# Patient Record
Sex: MALE | Race: BLACK OR AFRICAN AMERICAN | Employment: OTHER | URBAN - METROPOLITAN AREA
[De-identification: names, ages, dates, MRNs, and addresses within clinical notes are randomized per-mention and may not be internally consistent; named-entity substitution may affect disease eponyms.]

---

## 2017-10-18 ENCOUNTER — OFFICE VISIT (OUTPATIENT)
Dept: ORTHOPEDIC SURGERY | Age: 42
End: 2017-10-18

## 2017-10-18 VITALS
BODY MASS INDEX: 34.96 KG/M2 | WEIGHT: 190 LBS | HEART RATE: 77 BPM | SYSTOLIC BLOOD PRESSURE: 142 MMHG | RESPIRATION RATE: 16 BRPM | DIASTOLIC BLOOD PRESSURE: 92 MMHG | HEIGHT: 62 IN

## 2017-10-18 DIAGNOSIS — S82.62XA CLOSED AVULSION FRACTURE OF LATERAL MALLEOLUS OF LEFT FIBULA, INITIAL ENCOUNTER: Primary | ICD-10-CM

## 2017-10-18 PROCEDURE — 1036F TOBACCO NON-USER: CPT | Performed by: ORTHOPAEDIC SURGERY

## 2017-10-18 PROCEDURE — L4360 PNEUMAT WALKING BOOT PRE CST: HCPCS | Performed by: ORTHOPAEDIC SURGERY

## 2017-10-18 PROCEDURE — G8427 DOCREV CUR MEDS BY ELIG CLIN: HCPCS | Performed by: ORTHOPAEDIC SURGERY

## 2017-10-18 PROCEDURE — 99203 OFFICE O/P NEW LOW 30 MIN: CPT | Performed by: ORTHOPAEDIC SURGERY

## 2017-10-18 PROCEDURE — 27786 TREATMENT OF ANKLE FRACTURE: CPT | Performed by: ORTHOPAEDIC SURGERY

## 2017-10-18 PROCEDURE — G8484 FLU IMMUNIZE NO ADMIN: HCPCS | Performed by: ORTHOPAEDIC SURGERY

## 2017-10-18 PROCEDURE — G8417 CALC BMI ABV UP PARAM F/U: HCPCS | Performed by: ORTHOPAEDIC SURGERY

## 2017-10-18 NOTE — LETTER
[facility]  [address] [facility],[address] [facility] [address]  Phone: [phone]  Fax: [phone]    No ref. provider found        October 23, 2017       Patient: Yessica Henderson   MR Number: [id number]   YOB: 1975   Date of Visit: 10/18/2017       Dear Dr. Malcolm Felty ref. provider found: Thank you for the request for consultation for Yessica Henderson to me for  evaluation. Below are the relevant portions of my assessment and plan of care. CHIEF COMPLAINT: Left ankle pain / lateral malleolus avulsion fracture. DATE OF INJURY: 10/15/2017 DOT 10/18/2017    HISTORY:  Mr. Elliot Bonilla 39 y.o.  male presents today for the first visit for evaluation of a left ankle injury which occurred when he was walking down a step and rolled his left ankle. He was first seen and evaluated in [facility] ER, where he was x-rayed, splinted and asked to f/u with Orthopedics. He is complaining of lateral ankle pain and swelling. Rates pain a 4/10 VAS and constant sharp mild achy. This is better with elevation and worse with bearing any wt. The pain is sharp and not radiating. Alleviating factors: elevation and rest. No other complaint. Past Medical History:   Diagnosis Date    Sleep apnea        History reviewed. No pertinent surgical history. Social History     Social History    Marital status:      Spouse name: N/A    Number of children: N/A    Years of education: N/A     Occupational History    Not on file. Social History Main Topics    Smoking status: Never Smoker    Smokeless tobacco: Never Used    Alcohol use No    Drug use: No    Sexual activity: Not on file     Other Topics Concern    Not on file     Social History Narrative    No narrative on file       History reviewed. No pertinent family history.     Current Outpatient Prescriptions on File Prior to Visit   Medication Sig Dispense Refill office and instructed him  in care. We will see him  back in 6 weeks at which time we will get a new xray of the left ankle. Procedures    VT CLOSED [address] FIBULA FX    Breg Tall Genisus Walking Boot     Patient was prescribed a Breg Tall Genisus Walking Boot. The left ankle will require stabilization / immobilization from this semi-rigid / rigid orthosis to improve their function. The orthosis will assist in protecting the affected area, provide functional support and facilitate healing. The patient was educated and fit by a healthcare professional with expert knowledge and specialization in brace application while under the direct supervision of the physician. Verbal and written instructions for the use of and application of this item were provided. They were instructed to contact the office immediately should the brace result in increased pain, decreased sensation, increased swelling or worsening of the condition. Sameer Burdick MD   If you have questions, please do not hesitate to call me. I look forward to following  along with you.     Sincerely,        Sameer Burdick MD    CC providers:  Catrina Chiu MD  [address]  [address] [facility] [address]: [phone]

## 2017-10-19 NOTE — PROGRESS NOTES
CHIEF COMPLAINT: Left ankle pain / lateral malleolus avulsion fracture. DATE OF INJURY: 10/15/2017 DOT 10/18/2017    HISTORY:  Mr. Jayce Steel 39 y.o.  male presents today for the first visit for evaluation of a left ankle injury which occurred when he was walking down a step and rolled his left ankle. He was first seen and evaluated in [facility] ER, where he was x-rayed, splinted and asked to f/u with Orthopedics. He is complaining of lateral ankle pain and swelling. Rates pain a 4/10 VAS and constant sharp mild achy. This is better with elevation and worse with bearing any wt. The pain is sharp and not radiating. Alleviating factors: elevation and rest. No other complaint. Past Medical History:   Diagnosis Date    Sleep apnea        History reviewed. No pertinent surgical history. Social History     Social History    Marital status:      Spouse name: N/A    Number of children: N/A    Years of education: N/A     Occupational History    Not on file. Social History Main Topics    Smoking status: Never Smoker    Smokeless tobacco: Never Used    Alcohol use No    Drug use: No    Sexual activity: Not on file     Other Topics Concern    Not on file     Social History Narrative    No narrative on file       History reviewed. No pertinent family history. Current Outpatient Prescriptions on File Prior to Visit   Medication Sig Dispense Refill    ibuprofen (ADVIL;MOTRIN) 800 MG tablet Take 1 tablet by mouth every 8 hours as needed for Pain 20 tablet 0    HYDROcodone-acetaminophen (NORCO) 5-325 MG per tablet Take 1 tablet by mouth every 6 hours as needed for Pain . 10 tablet 0    ondansetron (ZOFRAN) 4 MG tablet Take 1 tablet by mouth every 8 hours as needed for Nausea. 20 tablet 0    omeprazole (PRILOSEC) 20 MG capsule Take 1 capsule by mouth daily. 30 capsule 0     No current facility-administered medications on file prior to visit.         Pertinent items are noted in expert knowledge and specialization in brace application while under the direct supervision of the physician. Verbal and written instructions for the use of and application of this item were provided. They were instructed to contact the office immediately should the brace result in increased pain, decreased sensation, increased swelling or worsening of the condition.          Rodney Bernard MD

## 2017-10-22 PROBLEM — S82.62XA CLOSED AVULSION FRACTURE OF LATERAL MALLEOLUS OF LEFT FIBULA: Status: ACTIVE | Noted: 2017-10-22

## 2017-10-23 NOTE — COMMUNICATION BODY
CHIEF COMPLAINT: Left ankle pain / lateral malleolus avulsion fracture. DATE OF INJURY: 10/15/2017 DOT 10/18/2017    HISTORY:  Mr. Emili Verde 39 y.o.  male presents today for the first visit for evaluation of a left ankle injury which occurred when he was walking down a step and rolled his left ankle. He was first seen and evaluated in [facility] ER, where he was x-rayed, splinted and asked to f/u with Orthopedics. He is complaining of lateral ankle pain and swelling. Rates pain a 4/10 VAS and constant sharp mild achy. This is better with elevation and worse with bearing any wt. The pain is sharp and not radiating. Alleviating factors: elevation and rest. No other complaint. Past Medical History:   Diagnosis Date    Sleep apnea        History reviewed. No pertinent surgical history. Social History     Social History    Marital status:      Spouse name: N/A    Number of children: N/A    Years of education: N/A     Occupational History    Not on file. Social History Main Topics    Smoking status: Never Smoker    Smokeless tobacco: Never Used    Alcohol use No    Drug use: No    Sexual activity: Not on file     Other Topics Concern    Not on file     Social History Narrative    No narrative on file       History reviewed. No pertinent family history. Current Outpatient Prescriptions on File Prior to Visit   Medication Sig Dispense Refill    ibuprofen (ADVIL;MOTRIN) 800 MG tablet Take 1 tablet by mouth every 8 hours as needed for Pain 20 tablet 0    HYDROcodone-acetaminophen (NORCO) 5-325 MG per tablet Take 1 tablet by mouth every 6 hours as needed for Pain . 10 tablet 0    ondansetron (ZOFRAN) 4 MG tablet Take 1 tablet by mouth every 8 hours as needed for Nausea. 20 tablet 0    omeprazole (PRILOSEC) 20 MG capsule Take 1 capsule by mouth daily. 30 capsule 0     No current facility-administered medications on file prior to visit.         Pertinent items are noted in expert knowledge and specialization in brace application while under the direct supervision of the physician. Verbal and written instructions for the use of and application of this item were provided. They were instructed to contact the office immediately should the brace result in increased pain, decreased sensation, increased swelling or worsening of the condition.          Danielle Whitaker MD

## 2017-12-01 ENCOUNTER — OFFICE VISIT (OUTPATIENT)
Dept: ORTHOPEDIC SURGERY | Age: 42
End: 2017-12-01

## 2017-12-01 VITALS — RESPIRATION RATE: 17 BRPM | BODY MASS INDEX: 37.31 KG/M2 | HEIGHT: 60 IN | WEIGHT: 190.04 LBS | HEART RATE: 70 BPM

## 2017-12-01 DIAGNOSIS — S82.62XD CLOSED AVULSION FRACTURE OF LATERAL MALLEOLUS OF LEFT FIBULA WITH ROUTINE HEALING, SUBSEQUENT ENCOUNTER: Primary | ICD-10-CM

## 2017-12-01 PROCEDURE — 99024 POSTOP FOLLOW-UP VISIT: CPT | Performed by: NURSE PRACTITIONER

## 2017-12-01 NOTE — LETTER
hours as needed for Pain 20 tablet 0    HYDROcodone-acetaminophen (NORCO) 5-325 MG per tablet Take 1 tablet by mouth every 6 hours as needed for Pain . 10 tablet 0    ondansetron (ZOFRAN) 4 MG tablet Take 1 tablet by mouth every 8 hours as needed for Nausea. 20 tablet 0    omeprazole (PRILOSEC) 20 MG capsule Take 1 capsule by mouth daily. 30 capsule 0     No current facility-administered medications on file prior to visit. Pertinent items are noted in HPI  Review of systems reviewed from Patient History Form dated on 10/18/2017 and available in the patient's chart under the Media tab. PHYSICAL EXAMINATION:  Mr. Martell Joyner is a very pleasant 43 y.o.  male who presents today in no acute distress, awake, alert, and oriented. He is well dressed, nourished and  groomed. Patient with normal affect. Height is  4' 11.06\" (1.5 m), weight is 190 lb 0.6 oz (86.2 kg), Body mass index is 38.31 kg/m². Resting respiratory rate is 16. Examination of the gait, showed that the patient walks WB left leg and in a boot . Examination of both ankles showing a full range of motion of the left ankle compare to the other side. There is mild to no swelling that can be seen, no ecchymosis. He has intact sensation and good pedal pulses. He has no tenderness on deep palpation over the lateral malleolus of the left ankle. IMAGING: Xrays, 3 views of the left ankle taken today in the office,  were reviewed, and showed a lateral malleolus avulsion fracture. IMPRESSION: Left ankle lateral malleolus ankle avulsion fracture. PLAN:  He will be WBAT and can discontinue the boot, and start aggressive ROM and peroneal strengthening exercise. No heavy impact activities. The patient will come back for a follow up in 2 months. At that time, we will take 3 views of the left ankle standing. Libby Lozano CNP   If you have questions, please do not hesitate to call me.  I look forward

## 2017-12-04 NOTE — PROGRESS NOTES
patient's chart under the Media tab. PHYSICAL EXAMINATION:  Mr. Parul Bourgeois is a very pleasant 43 y.o.  male who presents today in no acute distress, awake, alert, and oriented. He is well dressed, nourished and  groomed. Patient with normal affect. Height is  4' 11.06\" (1.5 m), weight is 190 lb 0.6 oz (86.2 kg), Body mass index is 38.31 kg/m². Resting respiratory rate is 16. Examination of the gait, showed that the patient walks WB left leg and in a boot . Examination of both ankles showing a full range of motion of the left ankle compare to the other side. There is mild to no swelling that can be seen, no ecchymosis. He has intact sensation and good pedal pulses. He has no tenderness on deep palpation over the lateral malleolus of the left ankle. IMAGING: Xrays, 3 views of the left ankle taken today in the office,  were reviewed, and showed a lateral malleolus avulsion fracture. IMPRESSION: Left ankle lateral malleolus ankle avulsion fracture. PLAN:  He will be WBAT and can discontinue the boot, and start aggressive ROM and peroneal strengthening exercise. No heavy impact activities. The patient will come back for a follow up in 2 months. At that time, we will take 3 views of the left ankle standing.   Surinder Bullard, CNP

## 2017-12-15 NOTE — COMMUNICATION BODY
patient's chart under the Media tab. PHYSICAL EXAMINATION:  Mr. Natalia Linder is a very pleasant 43 y.o.  male who presents today in no acute distress, awake, alert, and oriented. He is well dressed, nourished and  groomed. Patient with normal affect. Height is  4' 11.06\" (1.5 m), weight is 190 lb 0.6 oz (86.2 kg), Body mass index is 38.31 kg/m². Resting respiratory rate is 16. Examination of the gait, showed that the patient walks WB left leg and in a boot . Examination of both ankles showing a full range of motion of the left ankle compare to the other side. There is mild to no swelling that can be seen, no ecchymosis. He has intact sensation and good pedal pulses. He has no tenderness on deep palpation over the lateral malleolus of the left ankle. IMAGING: Xrays, 3 views of the left ankle taken today in the office,  were reviewed, and showed a lateral malleolus avulsion fracture. IMPRESSION: Left ankle lateral malleolus ankle avulsion fracture. PLAN:  He will be WBAT and can discontinue the boot, and start aggressive ROM and peroneal strengthening exercise. No heavy impact activities. The patient will come back for a follow up in 2 months. At that time, we will take 3 views of the left ankle standing.   Fredy Castro, CNP

## 2019-05-15 ENCOUNTER — HOSPITAL ENCOUNTER (INPATIENT)
Age: 44
LOS: 3 days | Discharge: HOME | DRG: 263 | End: 2019-05-18
Attending: INTERNAL MEDICINE | Admitting: INTERNAL MEDICINE
Payer: COMMERCIAL

## 2019-05-15 ENCOUNTER — HOSPITAL ENCOUNTER (EMERGENCY)
Age: 44
Discharge: HOME | DRG: 263 | End: 2019-05-15
Attending: EMERGENCY MEDICINE
Payer: COMMERCIAL

## 2019-05-15 ENCOUNTER — APPOINTMENT (OUTPATIENT)
Dept: GENERAL RADIOLOGY | Age: 44
DRG: 263 | End: 2019-05-15
Payer: COMMERCIAL

## 2019-05-15 ENCOUNTER — APPOINTMENT (OUTPATIENT)
Dept: CT IMAGING | Age: 44
DRG: 263 | End: 2019-05-15
Payer: COMMERCIAL

## 2019-05-15 ENCOUNTER — APPOINTMENT (OUTPATIENT)
Dept: ULTRASOUND IMAGING | Age: 44
DRG: 263 | End: 2019-05-15
Payer: COMMERCIAL

## 2019-05-15 VITALS
OXYGEN SATURATION: 99 % | DIASTOLIC BLOOD PRESSURE: 91 MMHG | BODY MASS INDEX: 39.47 KG/M2 | RESPIRATION RATE: 13 BRPM | TEMPERATURE: 98.6 F | HEART RATE: 90 BPM | SYSTOLIC BLOOD PRESSURE: 156 MMHG | WEIGHT: 195.77 LBS

## 2019-05-15 DIAGNOSIS — R03.0 ELEVATED BLOOD PRESSURE READING: ICD-10-CM

## 2019-05-15 DIAGNOSIS — K80.00 GALLSTONES AND INFLAMMATION OF GALLBLADDER WITHOUT OBSTRUCTION: ICD-10-CM

## 2019-05-15 DIAGNOSIS — R11.2 NAUSEA AND VOMITING, INTRACTABILITY OF VOMITING NOT SPECIFIED, UNSPECIFIED VOMITING TYPE: ICD-10-CM

## 2019-05-15 DIAGNOSIS — R94.31 T WAVE INVERSION IN ELECTROCARDIOGRAM: ICD-10-CM

## 2019-05-15 DIAGNOSIS — R10.9 ABDOMINAL PAIN, UNSPECIFIED ABDOMINAL LOCATION: Primary | ICD-10-CM

## 2019-05-15 DIAGNOSIS — K81.0 ACUTE CHOLECYSTITIS: Primary | ICD-10-CM

## 2019-05-15 DIAGNOSIS — R94.31 ABNORMAL EKG: ICD-10-CM

## 2019-05-15 DIAGNOSIS — K80.50 BILIARY COLIC: ICD-10-CM

## 2019-05-15 LAB
A/G RATIO: 0.9 (ref 1.1–2.2)
A/G RATIO: 1 (ref 1.1–2.2)
ALBUMIN SERPL-MCNC: 3.9 G/DL (ref 3.4–5)
ALBUMIN SERPL-MCNC: 4.1 G/DL (ref 3.4–5)
ALP BLD-CCNC: 69 U/L (ref 40–129)
ALP BLD-CCNC: 75 U/L (ref 40–129)
ALT SERPL-CCNC: 25 U/L (ref 10–40)
ALT SERPL-CCNC: 92 U/L (ref 10–40)
ANION GAP SERPL CALCULATED.3IONS-SCNC: 15 MMOL/L (ref 3–16)
ANION GAP SERPL CALCULATED.3IONS-SCNC: 18 MMOL/L (ref 3–16)
AST SERPL-CCNC: 111 U/L (ref 15–37)
AST SERPL-CCNC: 30 U/L (ref 15–37)
BASOPHILS ABSOLUTE: 0.1 K/UL (ref 0–0.2)
BASOPHILS ABSOLUTE: 0.2 K/UL (ref 0–0.2)
BASOPHILS RELATIVE PERCENT: 0.7 %
BASOPHILS RELATIVE PERCENT: 1 %
BILIRUB SERPL-MCNC: 0.7 MG/DL (ref 0–1)
BILIRUB SERPL-MCNC: 1.3 MG/DL (ref 0–1)
BUN BLDV-MCNC: 10 MG/DL (ref 7–20)
BUN BLDV-MCNC: 12 MG/DL (ref 7–20)
CALCIUM SERPL-MCNC: 10 MG/DL (ref 8.3–10.6)
CALCIUM SERPL-MCNC: 9.5 MG/DL (ref 8.3–10.6)
CHLORIDE BLD-SCNC: 100 MMOL/L (ref 99–110)
CHLORIDE BLD-SCNC: 99 MMOL/L (ref 99–110)
CO2: 22 MMOL/L (ref 21–32)
CO2: 23 MMOL/L (ref 21–32)
CREAT SERPL-MCNC: 1.2 MG/DL (ref 0.9–1.3)
CREAT SERPL-MCNC: 1.3 MG/DL (ref 0.9–1.3)
EKG ATRIAL RATE: 67 BPM
EKG ATRIAL RATE: 85 BPM
EKG DIAGNOSIS: NORMAL
EKG DIAGNOSIS: NORMAL
EKG P AXIS: 59 DEGREES
EKG P AXIS: 78 DEGREES
EKG P-R INTERVAL: 142 MS
EKG P-R INTERVAL: 144 MS
EKG Q-T INTERVAL: 392 MS
EKG Q-T INTERVAL: 432 MS
EKG QRS DURATION: 84 MS
EKG QRS DURATION: 86 MS
EKG QTC CALCULATION (BAZETT): 456 MS
EKG QTC CALCULATION (BAZETT): 466 MS
EKG R AXIS: 15 DEGREES
EKG R AXIS: 56 DEGREES
EKG T AXIS: 178 DEGREES
EKG T AXIS: 225 DEGREES
EKG VENTRICULAR RATE: 67 BPM
EKG VENTRICULAR RATE: 85 BPM
EOSINOPHILS ABSOLUTE: 0 K/UL (ref 0–0.6)
EOSINOPHILS ABSOLUTE: 0 K/UL (ref 0–0.6)
EOSINOPHILS RELATIVE PERCENT: 0.1 %
EOSINOPHILS RELATIVE PERCENT: 0.2 %
GFR AFRICAN AMERICAN: >60
GFR AFRICAN AMERICAN: >60
GFR NON-AFRICAN AMERICAN: >60
GFR NON-AFRICAN AMERICAN: >60
GLOBULIN: 4.1 G/DL
GLOBULIN: 4.5 G/DL
GLUCOSE BLD-MCNC: 120 MG/DL (ref 70–99)
GLUCOSE BLD-MCNC: 129 MG/DL (ref 70–99)
HCT VFR BLD CALC: 45.1 % (ref 40.5–52.5)
HCT VFR BLD CALC: 45.5 % (ref 40.5–52.5)
HEMOGLOBIN: 15.2 G/DL (ref 13.5–17.5)
HEMOGLOBIN: 15.3 G/DL (ref 13.5–17.5)
LIPASE: 168 U/L (ref 13–60)
LIPASE: 35 U/L (ref 13–60)
LYMPHOCYTES ABSOLUTE: 1.9 K/UL (ref 1–5.1)
LYMPHOCYTES ABSOLUTE: 2.3 K/UL (ref 1–5.1)
LYMPHOCYTES RELATIVE PERCENT: 12.9 %
LYMPHOCYTES RELATIVE PERCENT: 14.4 %
MCH RBC QN AUTO: 29.5 PG (ref 26–34)
MCH RBC QN AUTO: 30 PG (ref 26–34)
MCHC RBC AUTO-ENTMCNC: 33.3 G/DL (ref 31–36)
MCHC RBC AUTO-ENTMCNC: 34 G/DL (ref 31–36)
MCV RBC AUTO: 88.2 FL (ref 80–100)
MCV RBC AUTO: 88.8 FL (ref 80–100)
MONOCYTES ABSOLUTE: 1.1 K/UL (ref 0–1.3)
MONOCYTES ABSOLUTE: 1.5 K/UL (ref 0–1.3)
MONOCYTES RELATIVE PERCENT: 10.6 %
MONOCYTES RELATIVE PERCENT: 6.9 %
NEUTROPHILS ABSOLUTE: 10.9 K/UL (ref 1.7–7.7)
NEUTROPHILS ABSOLUTE: 12.6 K/UL (ref 1.7–7.7)
NEUTROPHILS RELATIVE PERCENT: 75.6 %
NEUTROPHILS RELATIVE PERCENT: 77.6 %
PDW BLD-RTO: 13.5 % (ref 12.4–15.4)
PDW BLD-RTO: 13.7 % (ref 12.4–15.4)
PLATELET # BLD: 250 K/UL (ref 135–450)
PLATELET # BLD: 262 K/UL (ref 135–450)
PMV BLD AUTO: 8.5 FL (ref 5–10.5)
PMV BLD AUTO: 8.6 FL (ref 5–10.5)
POTASSIUM REFLEX MAGNESIUM: 4.1 MMOL/L (ref 3.5–5.1)
POTASSIUM SERPL-SCNC: 3.9 MMOL/L (ref 3.5–5.1)
RBC # BLD: 5.11 M/UL (ref 4.2–5.9)
RBC # BLD: 5.13 M/UL (ref 4.2–5.9)
SODIUM BLD-SCNC: 137 MMOL/L (ref 136–145)
SODIUM BLD-SCNC: 140 MMOL/L (ref 136–145)
TOTAL PROTEIN: 8 G/DL (ref 6.4–8.2)
TOTAL PROTEIN: 8.6 G/DL (ref 6.4–8.2)
TROPONIN: <0.01 NG/ML
WBC # BLD: 14.4 K/UL (ref 4–11)
WBC # BLD: 16.2 K/UL (ref 4–11)

## 2019-05-15 PROCEDURE — 96361 HYDRATE IV INFUSION ADD-ON: CPT

## 2019-05-15 PROCEDURE — 87086 URINE CULTURE/COLONY COUNT: CPT

## 2019-05-15 PROCEDURE — 71046 X-RAY EXAM CHEST 2 VIEWS: CPT

## 2019-05-15 PROCEDURE — 93010 ELECTROCARDIOGRAM REPORT: CPT | Performed by: INTERNAL MEDICINE

## 2019-05-15 PROCEDURE — 2580000003 HC RX 258: Performed by: EMERGENCY MEDICINE

## 2019-05-15 PROCEDURE — 85025 COMPLETE CBC W/AUTO DIFF WBC: CPT

## 2019-05-15 PROCEDURE — 6370000000 HC RX 637 (ALT 250 FOR IP): Performed by: EMERGENCY MEDICINE

## 2019-05-15 PROCEDURE — 6360000002 HC RX W HCPCS: Performed by: EMERGENCY MEDICINE

## 2019-05-15 PROCEDURE — 74177 CT ABD & PELVIS W/CONTRAST: CPT

## 2019-05-15 PROCEDURE — 84484 ASSAY OF TROPONIN QUANT: CPT

## 2019-05-15 PROCEDURE — 93005 ELECTROCARDIOGRAM TRACING: CPT | Performed by: PHYSICIAN ASSISTANT

## 2019-05-15 PROCEDURE — 96374 THER/PROPH/DIAG INJ IV PUSH: CPT

## 2019-05-15 PROCEDURE — 85610 PROTHROMBIN TIME: CPT

## 2019-05-15 PROCEDURE — 6360000004 HC RX CONTRAST MEDICATION: Performed by: EMERGENCY MEDICINE

## 2019-05-15 PROCEDURE — 76705 ECHO EXAM OF ABDOMEN: CPT

## 2019-05-15 PROCEDURE — 6360000002 HC RX W HCPCS: Performed by: PHYSICIAN ASSISTANT

## 2019-05-15 PROCEDURE — 99285 EMERGENCY DEPT VISIT HI MDM: CPT

## 2019-05-15 PROCEDURE — 2500000003 HC RX 250 WO HCPCS: Performed by: NURSE PRACTITIONER

## 2019-05-15 PROCEDURE — 80053 COMPREHEN METABOLIC PANEL: CPT

## 2019-05-15 PROCEDURE — 6360000002 HC RX W HCPCS: Performed by: INTERNAL MEDICINE

## 2019-05-15 PROCEDURE — 93005 ELECTROCARDIOGRAM TRACING: CPT | Performed by: EMERGENCY MEDICINE

## 2019-05-15 PROCEDURE — 2580000003 HC RX 258: Performed by: INTERNAL MEDICINE

## 2019-05-15 PROCEDURE — 1200000000 HC SEMI PRIVATE

## 2019-05-15 PROCEDURE — 83690 ASSAY OF LIPASE: CPT

## 2019-05-15 RX ORDER — ONDANSETRON 2 MG/ML
4 INJECTION INTRAMUSCULAR; INTRAVENOUS EVERY 6 HOURS PRN
Status: DISCONTINUED | OUTPATIENT
Start: 2019-05-15 | End: 2019-05-18 | Stop reason: HOSPADM

## 2019-05-15 RX ORDER — AMOXICILLIN AND CLAVULANATE POTASSIUM 875; 125 MG/1; MG/1
1 TABLET, FILM COATED ORAL 2 TIMES DAILY
Qty: 20 TABLET | Refills: 0 | Status: ON HOLD | OUTPATIENT
Start: 2019-05-15 | End: 2019-05-18 | Stop reason: HOSPADM

## 2019-05-15 RX ORDER — SODIUM CHLORIDE 0.9 % (FLUSH) 0.9 %
10 SYRINGE (ML) INJECTION PRN
Status: DISCONTINUED | OUTPATIENT
Start: 2019-05-15 | End: 2019-05-17

## 2019-05-15 RX ORDER — ONDANSETRON 4 MG/1
4 TABLET, ORALLY DISINTEGRATING ORAL EVERY 8 HOURS PRN
Qty: 10 TABLET | Refills: 0 | Status: SHIPPED | OUTPATIENT
Start: 2019-05-15 | End: 2020-11-13

## 2019-05-15 RX ORDER — MORPHINE SULFATE 2 MG/ML
2 INJECTION, SOLUTION INTRAMUSCULAR; INTRAVENOUS EVERY 4 HOURS PRN
Status: DISCONTINUED | OUTPATIENT
Start: 2019-05-15 | End: 2019-05-18 | Stop reason: HOSPADM

## 2019-05-15 RX ORDER — AMOXICILLIN AND CLAVULANATE POTASSIUM 875; 125 MG/1; MG/1
1 TABLET, FILM COATED ORAL ONCE
Status: COMPLETED | OUTPATIENT
Start: 2019-05-15 | End: 2019-05-15

## 2019-05-15 RX ORDER — SODIUM CHLORIDE 0.9 % (FLUSH) 0.9 %
10 SYRINGE (ML) INJECTION EVERY 12 HOURS SCHEDULED
Status: DISCONTINUED | OUTPATIENT
Start: 2019-05-15 | End: 2019-05-17

## 2019-05-15 RX ORDER — MORPHINE SULFATE 2 MG/ML
4 INJECTION, SOLUTION INTRAMUSCULAR; INTRAVENOUS
Status: DISCONTINUED | OUTPATIENT
Start: 2019-05-15 | End: 2019-05-15 | Stop reason: HOSPADM

## 2019-05-15 RX ORDER — SODIUM CHLORIDE, SODIUM LACTATE, POTASSIUM CHLORIDE, CALCIUM CHLORIDE 600; 310; 30; 20 MG/100ML; MG/100ML; MG/100ML; MG/100ML
INJECTION, SOLUTION INTRAVENOUS CONTINUOUS
Status: DISCONTINUED | OUTPATIENT
Start: 2019-05-15 | End: 2019-05-17

## 2019-05-15 RX ORDER — MORPHINE SULFATE 2 MG/ML
4 INJECTION, SOLUTION INTRAMUSCULAR; INTRAVENOUS ONCE
Status: COMPLETED | OUTPATIENT
Start: 2019-05-15 | End: 2019-05-15

## 2019-05-15 RX ORDER — CIPROFLOXACIN 2 MG/ML
400 INJECTION, SOLUTION INTRAVENOUS EVERY 12 HOURS
Status: DISCONTINUED | OUTPATIENT
Start: 2019-05-15 | End: 2019-05-18 | Stop reason: HOSPADM

## 2019-05-15 RX ORDER — 0.9 % SODIUM CHLORIDE 0.9 %
1000 INTRAVENOUS SOLUTION INTRAVENOUS ONCE
Status: COMPLETED | OUTPATIENT
Start: 2019-05-15 | End: 2019-05-15

## 2019-05-15 RX ORDER — MORPHINE SULFATE 2 MG/ML
1 INJECTION, SOLUTION INTRAMUSCULAR; INTRAVENOUS EVERY 4 HOURS PRN
Status: DISCONTINUED | OUTPATIENT
Start: 2019-05-15 | End: 2019-05-18 | Stop reason: HOSPADM

## 2019-05-15 RX ORDER — OXYCODONE HYDROCHLORIDE AND ACETAMINOPHEN 5; 325 MG/1; MG/1
1 TABLET ORAL EVERY 6 HOURS PRN
Qty: 12 TABLET | Refills: 0 | Status: ON HOLD | OUTPATIENT
Start: 2019-05-15 | End: 2019-05-18 | Stop reason: HOSPADM

## 2019-05-15 RX ORDER — LISINOPRIL 10 MG/1
10 TABLET ORAL DAILY
COMMUNITY
End: 2020-05-11 | Stop reason: SDUPTHER

## 2019-05-15 RX ADMIN — METRONIDAZOLE 500 MG: 500 INJECTION, SOLUTION INTRAVENOUS at 22:31

## 2019-05-15 RX ADMIN — MORPHINE SULFATE 4 MG: 2 INJECTION, SOLUTION INTRAMUSCULAR; INTRAVENOUS at 05:01

## 2019-05-15 RX ADMIN — ENOXAPARIN SODIUM 40 MG: 40 INJECTION SUBCUTANEOUS at 18:30

## 2019-05-15 RX ADMIN — Medication 10 ML: at 20:35

## 2019-05-15 RX ADMIN — SODIUM CHLORIDE, POTASSIUM CHLORIDE, SODIUM LACTATE AND CALCIUM CHLORIDE: 600; 310; 30; 20 INJECTION, SOLUTION INTRAVENOUS at 18:30

## 2019-05-15 RX ADMIN — MORPHINE SULFATE 4 MG: 2 INJECTION, SOLUTION INTRAMUSCULAR; INTRAVENOUS at 16:03

## 2019-05-15 RX ADMIN — Medication 10 ML: at 20:28

## 2019-05-15 RX ADMIN — AMOXICILLIN AND CLAVULANATE POTASSIUM 1 TABLET: 875; 125 TABLET, FILM COATED ORAL at 08:14

## 2019-05-15 RX ADMIN — IOPAMIDOL 75 ML: 755 INJECTION, SOLUTION INTRAVENOUS at 06:27

## 2019-05-15 RX ADMIN — SODIUM CHLORIDE 1000 ML: 9 INJECTION, SOLUTION INTRAVENOUS at 04:56

## 2019-05-15 ASSESSMENT — PAIN - FUNCTIONAL ASSESSMENT
PAIN_FUNCTIONAL_ASSESSMENT: ACTIVITIES ARE NOT PREVENTED
PAIN_FUNCTIONAL_ASSESSMENT: ACTIVITIES ARE NOT PREVENTED
PAIN_FUNCTIONAL_ASSESSMENT: 0-10
PAIN_FUNCTIONAL_ASSESSMENT: ACTIVITIES ARE NOT PREVENTED
PAIN_FUNCTIONAL_ASSESSMENT: ACTIVITIES ARE NOT PREVENTED

## 2019-05-15 ASSESSMENT — PAIN DESCRIPTION - PAIN TYPE
TYPE: ACUTE PAIN

## 2019-05-15 ASSESSMENT — PAIN DESCRIPTION - PROGRESSION
CLINICAL_PROGRESSION: NOT CHANGED
CLINICAL_PROGRESSION: GRADUALLY WORSENING
CLINICAL_PROGRESSION: GRADUALLY WORSENING
CLINICAL_PROGRESSION: RAPIDLY IMPROVING
CLINICAL_PROGRESSION: NOT CHANGED

## 2019-05-15 ASSESSMENT — PAIN DESCRIPTION - ONSET
ONSET: PROGRESSIVE
ONSET: ON-GOING
ONSET: PROGRESSIVE

## 2019-05-15 ASSESSMENT — PAIN SCALES - GENERAL
PAINLEVEL_OUTOF10: 6
PAINLEVEL_OUTOF10: 2
PAINLEVEL_OUTOF10: 8
PAINLEVEL_OUTOF10: 8
PAINLEVEL_OUTOF10: 5
PAINLEVEL_OUTOF10: 6
PAINLEVEL_OUTOF10: 2
PAINLEVEL_OUTOF10: 6
PAINLEVEL_OUTOF10: 0
PAINLEVEL_OUTOF10: 4

## 2019-05-15 ASSESSMENT — PAIN DESCRIPTION - LOCATION
LOCATION: ABDOMEN

## 2019-05-15 ASSESSMENT — PAIN DESCRIPTION - DESCRIPTORS
DESCRIPTORS: DISCOMFORT
DESCRIPTORS: SHARP

## 2019-05-15 ASSESSMENT — PAIN DESCRIPTION - FREQUENCY
FREQUENCY: CONTINUOUS
FREQUENCY: INTERMITTENT
FREQUENCY: CONTINUOUS

## 2019-05-15 ASSESSMENT — PAIN DESCRIPTION - ORIENTATION
ORIENTATION: MID;LOWER
ORIENTATION: RIGHT
ORIENTATION: RIGHT

## 2019-05-15 ASSESSMENT — HEART SCORE: ECG: 1

## 2019-05-15 NOTE — ED PROVIDER NOTES
[address]  eMERGENCY dEPARTMENT eNCOUnter      Pt Name: Amy Del Valle  MRN: [id number]  Jacegfpablito 1975  Date of evaluation: 5/15/2019  Provider: Rebecca Millan Dr       Chief Complaint   Patient presents with    Abdominal Pain     seen earlier today. dx with early cholecysitis. HISTORY OF PRESENT ILLNESS  (Location/Symptom, Timing/Onset, Context/Setting, Quality, Duration, Modifying Factors, Severity.)   Amy Del Valle is a 37 y.o. male who presents to the emergency department with the complaint of right upper quadrant abdominal pain. Patient was seen in this ED earlier today with the complaint of about 12 hours of this pain, along with nausea and vomiting, and he reported that he'd had pain like this previously but never this bad. He was given an EKG that showed deeply inverted T waves in the percordial leads, and the attending physician initially called a STEMI alert but after discussion with the cardiologist Dr Rachna Trujillo it was determined there was no STEMI. Patient's workup showed a leukocytosis with left shift, and CT scan with IV contrast showed gallstones with gallbladder wall thickening. Ultrasound was not available at that time. The patient was strongly advised to be admitted to the hospital but he refused admission and was discharged with prescriptions for a course of Augment and pain and nausea medications. Patient returns now because he says his family members convinced him to be admitted, and he says his pain has come back. Says he's presently nauseous but no vomiting since he left the hospital. Denies chest pain or shortness of breath. No other complaints. Nursing Notes were reviewed and I agree. REVIEW OF SYSTEMS    (2-9 systems for level 4, 10 or more for level 5)     Constitutional:  Negative for fever, chills, appetite change, fatigue and unexpected weight change.    HENT:  Negative for congestion, ear pain, facial swelling, rhinorrhea, sinus pressure, sneezing, sore throat and trouble swallowing. Eyes:  Negative for photophobia, pain and visual disturbance. Respiratory:  Negative for cough, shortness of breath, wheezing and stridor. Cardiovascular:  Negative for chest pain, palpitations and leg swelling. Gastrointestinal:  Positive for right upper quadrant pain, nausea, recent vomiting. Negative for diarrhea, constipation and blood in stool. Genitourinary:  Negative for dysuria, urgency, hematuria, flank pain, and pelvic pain. Musculoskeletal:  Negative for myalgias, arthralgias, neck pain and neck stiffness. Neurological:  Negative for dizziness, seizures, syncope, speech difficulty, weakness, light-headedness, numbness and headaches. Psychiatric/Behavioral:  Negative for suicidal ideas, hallucinations, confusion, sleep disturbance and agitation. Except as noted above the remainder of the review of systems was reviewed and negative. PAST MEDICAL HISTORY         Diagnosis Date    Hyperlipidemia     Hypertension     Sleep apnea        SURGICAL HISTORY     No past surgical history on file. CURRENT MEDICATIONS       Previous Medications    AMOXICILLIN-CLAVULANATE (AUGMENTIN) 875-125 MG PER TABLET    Take 1 tablet by mouth 2 times daily for 10 days    LISINOPRIL PO    Take by mouth    ONDANSETRON (ZOFRAN ODT) 4 MG DISINTEGRATING TABLET    Take 1 tablet by mouth every 8 hours as needed for Nausea or Vomiting    OXYCODONE-ACETAMINOPHEN (PERCOCET) 5-325 MG PER TABLET    Take 1 tablet by mouth every 6 hours as needed for Pain for up to 7 days. ALLERGIES     Patient has no known allergies. FAMILY HISTORY     No family history on file. No family status information on file. SOCIAL HISTORY      reports that he has never smoked. He has never used smokeless tobacco. He reports that he does not drink alcohol or use drugs.     PHYSICAL EXAM    (up to 7 for level 4, 8 or more for level 5)     ED Triage  (*)     All other components within normal limits    Narrative:     Performed at:  [facility]  [address]  [place] [facility] [address]   Phone [phone]   LIPASE - Abnormal; Notable for the following components:    Lipase 168.0 (*)     All other components within normal limits    Narrative:     Performed at:  [facility]  [address]  [place] [facility] [address]   Phone [phone]   TROPONIN    Narrative:     Performed at:  [facility]  [address] [place] [address]   Phone [phone]   URINE RT REFLEX TO CULTURE       All other labs were within normal range or not returned as of this dictation. EMERGENCY DEPARTMENT COURSE and DIFFERENTIAL DIAGNOSIS/MDM:   Vitals:    Vitals:    05/15/19 1253   BP: (!) 139/91   Pulse: 84   Resp: 16   Temp: 98.5 °F (36.9 °C)   SpO2: 97%   Weight: 195 lb 5.2 oz (88.6 kg)   Height: 4' 11\" (1.499 m)       The patient's condition in the ED was stable, the patient was afebrile and nontoxic in appearance, and the patient's physical exam was unremarkable other than for the tenderness noted above. Labs showed a slightly improved WBC but lipase increased to 168 from 83 earlier today. Ultrasound showed cholelithiasis with equivocal cholecystitis. I consulted general surgery, and the patient was visited in the ED. I then consulted the hospitalist, who agreed to accept and admit the patient. PROCEDURES:  None    FINAL IMPRESSION      1. Acute cholecystitis    2.  T wave inversion in electrocardiogram          DISPOSITION/PLAN   DISPOSITION Admitted 05/15/2019 03:11:15 PM      PATIENT REFERRED TO:  Oralia Carlson MD  [address] [id number] [phone]            DISCHARGE MEDICATIONS:  New Prescriptions    No medications on file       (Please note that portions of this note were completed with a voice recognition program.  Efforts were made to edit the dictations but occasionally words are mis-transcribed.)    Neli Quintero, [address] [place] [address],[address] [place] [address]  05/15/19 [id number]

## 2019-05-15 NOTE — ED NOTES
MD concerned with STEMI. Code STEMI called.   MD Sera Tong returned called and cancelled STEMI.,       Bear Griffith, RN  05/15/19 [id number]

## 2019-05-15 NOTE — ED NOTES
Discharge and education instructions reviewed. Patient instructed to follow-up as noted - return to emergency department if symptoms worsen. Patient verbalized understanding. Patient denied questions at this time. No acute distress noted. Discharged per provider with discharge instructions.           Mike Moreno RN  05/15/19 [id number]

## 2019-05-15 NOTE — ED TRIAGE NOTES
Patient has complaint of abdominal pain x12 hours. Patient alert and orientated x4. Patient has complaint of sob with pain. EKG completed on arrival to the room.

## 2019-05-15 NOTE — H&P
Hospital Medicine History & Physical      PCP: No primary care provider on file. Date of Admission: 5/15/2019    Date of Service: Pt seen/examined on 5/15/2019 and Admitted to Inpatient with expected LOS greater than two midnights due to medical therapy. Chief Complaint:  Right sided abdominal pain      History Of Present Illness:     37 y.o. male who presented to [facility] [place] with right-sided abdominal pain. Patient states pain began yesterday and was sudden. Patient described the pain as sharp in character. Patient was seen here earlier during the day and was diagnosed with gallstones but patient left against medical advice. He now returns after his mother urged him to get treatment. Patient states that the pain has been intermittent and is not relate this pain to meals. Patient denies midsternal chest pain. Past Medical History:          Diagnosis Date    Hyperlipidemia     Hypertension     Sleep apnea        Past Surgical History:      History reviewed. No pertinent surgical history. Medications Prior to Admission:      Prior to Admission medications    Medication Sig Start Date End Date Taking? Authorizing Provider   lisinopril (PRINIVIL;ZESTRIL) 10 MG tablet Take 10 mg by mouth daily    Yes Historical Provider, MD   amoxicillin-clavulanate (AUGMENTIN) 875-125 MG per tablet Take 1 tablet by mouth 2 times daily for 10 days 5/15/19 5/25/19 Yes Lalita Mast MD   oxyCODONE-acetaminophen (PERCOCET) 5-325 MG per tablet Take 1 tablet by mouth every 6 hours as needed for Pain for up to 7 days. 5/15/19 5/22/19  Lalita Mast MD   ondansetron (ZOFRAN ODT) 4 MG disintegrating tablet Take 1 tablet by mouth every 8 hours as needed for Nausea or Vomiting 5/15/19   Lalita Mast MD       Allergies:  Patient has no known allergies. Social History:      The patient currently lives with family    TOBACCO:   reports that he has never smoked.  He has never used smokeless tobacco.  ETOH:   reports that he does not drink alcohol. Family History:      Reviewed in detail and negative for DM, CAD, Cancer, CVA. Positive as follows:    History reviewed. No pertinent family history. REVIEW OF SYSTEMS:   Pertinent positives as noted in the HPI. All other systems reviewed and negative. PHYSICAL EXAM PERFORMED:    BP (!) 184/72   Pulse 86   Temp 98 °F (36.7 °C) (Oral)   Resp 18   Ht 4' 11\" (1.499 m)   Wt 195 lb 5.2 oz (88.6 kg)   SpO2 93%   BMI 39.45 kg/m²     General appearance:  No apparent distress, appears stated age and cooperative. HEENT:  Normal cephalic, atraumatic without obvious deformity. Pupils equal, round, and reactive to light. Extra ocular muscles intact. Conjunctivae/corneas clear. Neck: Supple, with full range of motion. No jugular venous distention. Trachea midline. Respiratory:  Normal respiratory effort. Clear to auscultation, bilaterally without Rales/Wheezes/Rhonchi. Cardiovascular:  Regular rate and rhythm with normal S1/S2 without murmurs, rubs or gallops. Abdomen: Soft, + murphys sign  Musculoskeletal:  No clubbing, cyanosis or edema bilaterally. Full range of motion without deformity. Skin: Skin color, texture, turgor normal.  No rashes or lesions. Neurologic:  Neurovascularly intact without any focal sensory/motor deficits.  Cranial nerves: II-XII intact, grossly non-focal.  Psychiatric:  Alert and oriented, thought content appropriate, normal insight  Capillary Refill: Brisk,< 3 seconds   Peripheral Pulses: +2 palpable, equal bilaterally       Labs:     Recent Labs     05/15/19  0452 05/15/19  1308   WBC 16.2* 14.4*   HGB 15.3 15.2   HCT 45.1 45.5    250     Recent Labs     05/15/19  0452 05/15/19  1308    137   K 4.1 3.9    99   CO2 22 23   BUN 12 10   CREATININE 1.3 1.2   CALCIUM 10.0 9.5     Recent Labs     05/15/19  0452 05/15/19  1308   AST 30 111*   ALT 25 92*   BILITOT 0.7 1.3*   ALKPHOS 69 75     No results for input(s): INR in the last 72 hours. Recent Labs     05/15/19  0452 05/15/19  0800 05/15/19  1308   TROPONINI <0.01 <0.01 <0.01       Urinalysis:    No results found for: Patito Dowse, BACTERIA, RBCUA, BLOODU, SPECGRAV, GLUCOSEU    Radiology:     reviewed  [address] [place] [address]   Final Result   1. Cholelithiasis with equivocal cholecystitis. 2. Fatty infiltration of the liver with focal sparing. ASSESSMENT:    Acute cholecystitis  Abnormal EKG  HTN    PLAN:    cipro flagyl iv  Morphine iv for pain  gen surgery consult may need lap mckinley  ivf  Npo  Echo  Cardiology consult for abn ekg  Trend troponins    DVT Prophylaxis: lovenox  Diet: No diet orders on file  Code Status: No Order      Dispo  2-3 d       Aroldo Connell MD    Thank you No primary care provider on file. for the opportunity to be involved in this patient's care. If you have any questions or concerns please feel free to contact me at [phone].

## 2019-05-15 NOTE — ED NOTES
Pharmacy Medication Reconciliation Note     List of medications patient is currently taking is complete. Allergies:  Patient has no known allergies. Source of information:   1. patient  2. [facility]    Notes regarding home medications:   1. Just d/c from ED this morning - did not get a chance to take those medications  2. Not compliant with lisinopril - LF in Nov for 30 days supply. Admits this is likely accurate.  Counseled on importance of taking daily for best benefit     Denies taking any other OTC or herbal medications    Lb Tinoco PharmD, BCPS  5/15/2019  5:53 PM

## 2019-05-15 NOTE — CONSULTS
General Surgery    Asked to see for cholecystitis   CT and ultrasound confirm cholelithiasis and GB wall thickening   Also with abnormal ECG, cardiac workup underwayy    If cleared from cardiac standpoint would go ahead with cholecystectomy Thursday PM or Friday    Agree with antibiotics  NPO after midnight      Electronically signed by Leelee Dumont MD on 5/15/2019 at 5:31 PM

## 2019-05-15 NOTE — PROGRESS NOTES
Received patient from ED and got report from Bhupendra montiel there. Patient up with spouse. No n/v.  Abdominal pain controlled with the morphine he had just gotten in the ED. Ordered himself a clear liquid diet for dinner. Explained NPO after midnight.   IV had become dislodged in his

## 2019-05-15 NOTE — ED PROVIDER NOTES
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 He states that it started approximately 12 hours ago, he describes the pain as.  He's been nauseous and had 1 episode of vomiting here in the ER vomiting was associated with diaphoresis but the diaphoresis resolved. He states he had intermittent right upper quadrant pain in the past at this time is more severe and it would not go away. Pain does radiate underneath his right rib cage in the lower portion. Denies fever, chills, malaise,   shortness of breath, cough, diarrhea, headache, sore throat, dysuria, back pain, rash. No palliative/provocative factors. Positives and pertinent negatives as per HPI. All other of the 10 systems were reviewed and are negative. I have reviewed the following from the nursing documentation:      Prior to Admission medications    Medication Sig Start Date End Date Taking? Authorizing Provider   LISINOPRIL PO Take by mouth   Yes Historical Provider, MD       Allergies as of 05/15/2019    (No Known Allergies)       Past Medical History:   Diagnosis Date    Hyperlipidemia     Hypertension     Sleep apnea         Surgical History: No past surgical history on file. Family History:  No family history on file.     Social History     Socioeconomic History    Marital status:      Spouse name: Not on file    Number of children: Not on file    Years of education: Not on file    Highest education level: Not on file   Occupational History    Not on file   Social Needs    Financial resource strain: Not on file    Food insecurity:     Worry: Not on file     Inability: Not on file    Transportation needs:     Medical: Not on file     Non-medical: Not on file   Tobacco Use    Smoking status: Never Smoker    Smokeless tobacco: Never Used   Substance and Sexual Activity    Alcohol use: No    Drug use: No    Sexual activity: Not on file   Lifestyle    Physical activity:     Days per week: Not on file     Minutes per session: Not on file    Stress: Not on file   Relationships    Social connections:     Talks on phone: Not on file     Gets together: Not on file     Attends Confucianism service: Not on file     Active member of club or organization: Not on file     Attends meetings of clubs or organizations: Not on file     Relationship status: Not on file    Intimate partner violence:     Fear of current or ex partner: Not on file     Emotionally abused: Not on file     Physically abused: Not on file     Forced sexual activity: Not on file   Other Topics Concern    Not on file   Social History Narrative    Not on file       Nursing notes reviewed. ED Triage Vitals [05/15/19 0431]   Enc Vitals Group      BP (!) 162/99      Pulse 68      Resp (!) 34      Temp 98.6 °F (37 °C)      Temp Source Oral      SpO2 100 %      Weight 195 lb 12.3 oz (88.8 kg)      Height       Head Circumference       Peak Flow       Pain Score       Pain Loc       Pain Edu? Excl. in [address]? GENERAL:  Awake, alert. Well developed, well nourished with apparent distress. HENT:  Normocephalic, Atraumatic, no lacerations. EYES:  Conjunctiva normal, Pupils equal round and reactive to light, extraocular movements normal.  NECK:  Trachea is midline. No stridor. No lymphadenopathy of the anterior cervical chain and no lymphadenopathy of the posterior cervical chain. No meningeal signs, Supple without JVD. No C-spine tenderness. CHEST:  Regular rate and regular rhythm, no murmurs/rubs/gallops, normal S1/S2, chest wall non-tender. LUNGS:  No respiratory distress. No abdominal retractions, no sternal retractions. Clear to auscultation bilaterally, no wheezing, no rhochi, no rales  ABDOMEN:  Soft, +Lujan's sign, non-distended.  No guarding and no rebound. No costovertebral angle tenderness to palpation. Normal BS, no organomegaly, no abdominal masses  EXTREMITIES:  Normal range of motion, no edema, no tenderness, no deformity, distal pulses present and equal bilaterally. Moves extremities x4 with purpose. SKIN: Warm, dry and intact. NEUROLOGIC: Normal mental status. Moving all extremities to command. Alert and oriented x4 without focal deficit or gross sensory deficit. Normal speech. Strength 5/5 in bilateral upper and lower extremities. PSYCHIATRIC: Not anxious, normal mood and affect, thoughts are linear and organized, without delusions/hallucinations, responds appropriately to questions      LABS and DIAGNOSTIC RESULTS  EKG  The Ekg interpreted by me shows  normal sinus rhythm with a rate of 67 and LVH  Axis is   Normal  QTc is  within an acceptable range  Intervals and Durations are unremarkable. ST Segments: depression in  I, II, aVf and lateral leads, ST elevation in aVR  Delta waves, Brugada Syndrome, and Short VT are not present. Prior EKG to compare with was not available. EKG  The Ekg interpreted by me shows  normal sinus rhythm and LVH with a rate of 85  Axis is   Normal  QTc is  normal  Intervals and Durations are unremarkable. ST Segments: The depression and elevations are unchanged compared to prior EKG except the T wave inversions that were present in leads 3 and aVF earlier are now normal T wave  Delta waves, Brugada Syndrome, and Short VT are not present. Prior EKG to compare with was available. No significant changes compared to prior EKG from today      RADIOLOGY  X-RAYS:  I have reviewed radiologic plain film image(s). ALL OTHER NON-PLAIN FILM IMAGES SUCH AS CT, ULTRASOUND AND MRI HAVE BEEN READ BY THE RADIOLOGIST. CT ABDOMEN PELVIS W IV CONTRAST Additional Contrast? None   Final Result   Multiple gallstones are seen. There is nonspecific gallbladder wall   thickening.   Consider early cholecystitis in the appropriate clinical scenario      Wedge-shaped area of hypodensity seen within the left kidney. There is a   nonspecific finding but could represent an early finding on underlying   genitourinary tract infection. Correlate with urinalysis. Trace nonspecific free fluid in pelvis         XR CHEST STANDARD (2 VW)   Final Result   No acute findings.             LABS  Results for orders placed or performed during the hospital encounter of 05/15/19   CBC Auto Differential   Result Value Ref Range    WBC 16.2 (H) 4.0 - 11.0 K/uL    RBC 5.11 4.20 - 5.90 M/uL    Hemoglobin 15.3 13.5 - 17.5 g/dL    Hematocrit 45.1 40.5 - 52.5 %    MCV 88.2 80.0 - 100.0 fL    MCH 30.0 26.0 - 34.0 pg    MCHC 34.0 31.0 - 36.0 g/dL    RDW 13.5 12.4 - 15.4 %    Platelets 869 275 - 161 K/uL    MPV 8.5 5.0 - 10.5 fL    Neutrophils % 77.6 %    Lymphocytes % 14.4 %    Monocytes % 6.9 %    Eosinophils % 0.1 %    Basophils % 1.0 %    Neutrophils # 12.6 (H) 1.7 - 7.7 K/uL    Lymphocytes # 2.3 1.0 - 5.1 K/uL    Monocytes # 1.1 0.0 - 1.3 K/uL    Eosinophils # 0.0 0.0 - 0.6 K/uL    Basophils # 0.2 0.0 - 0.2 K/uL   Comprehensive Metabolic Panel w/ Reflex to MG   Result Value Ref Range    Sodium 140 136 - 145 mmol/L    Potassium reflex Magnesium 4.1 3.5 - 5.1 mmol/L    Chloride 100 99 - 110 mmol/L    CO2 22 21 - 32 mmol/L    Anion Gap 18 (H) 3 - 16    Glucose 129 (H) 70 - 99 mg/dL    BUN 12 7 - 20 mg/dL    CREATININE 1.3 0.9 - 1.3 mg/dL    GFR Non-African American >60 >60    GFR African American >60 >60    Calcium 10.0 8.3 - 10.6 mg/dL    Total Protein 8.6 (H) 6.4 - 8.2 g/dL    Alb 4.1 3.4 - 5.0 g/dL    Albumin/Globulin Ratio 0.9 (L) 1.1 - 2.2    Total Bilirubin 0.7 0.0 - 1.0 mg/dL    Alkaline Phosphatase 69 40 - 129 U/L    ALT 25 10 - 40 U/L    AST 30 15 - 37 U/L    Globulin 4.5 g/dL   Lipase   Result Value Ref Range    Lipase 35.0 13.0 - 60.0 U/L   Troponin   Result Value Ref Range    Troponin <0.01 <0.01 ng/mL   Troponin   Result Value Ref Range Troponin <0.01 <0.01 ng/mL       PROCEDURES      MEDICAL DECISION MAKING    Procedures/interventions/images ordered for this visit  Orders Placed This Encounter   Procedures    XR CHEST STANDARD (2 VW)    CT ABDOMEN PELVIS W IV CONTRAST Additional Contrast? None    CBC Auto Differential    Comprehensive Metabolic Panel w/ Reflex to MG    Lipase    Urinalysis Reflex to Culture    Troponin    Initiate Oxygen Therapy Protocol    EKG 12 Lead    Saline lock IV       Medications ordered for this visit  Orders Placed This Encounter   Medications    0.9 % sodium chloride bolus    morphine (PF) injection 4 mg    iopamidol (ISOVUE-370) 76 % injection 75 mL       I did have significant concerns about the changes on his EKG upon arrival.  Initially called a code STEMI, after discussion with Dr. Zbigniew Tran the code STEMI was canceled. First troponin is negative. After the CAT scan indicating gallstones with inflammation of the gallbladder I went back to the patient and discussed the finding. I did have a discussion with the patient about his abnormal EKG and his risk factors and that with his heart score he is in a moderate risk category. I did offer admission to him for a complete evaluation of his cardiac history. I did however point to him that is likely that his pain is secondary to the biliary colic and gallstones. After discussion with his wife the patient had decided to leave. He is aware of the significant risk factors as well as the possibility of cardiac arrest/MI that could result in death. Using shared decision making, I will discharge the patient with a cardiology follow-up as well as surgery follow-up. Patient will be started on antibiotics for the cholecystitis. After the second EKG was done I did go back and discuss the abnormal EKG and once again offered admission, patient declined. I have recommended admission to the hospital and/or further workup, but the patient refuses.  The risks (including but not limited to suffering, loss of limb, loss of wages, loss of sexual function and death) as well as the benefits were explained to the patient. Questions were sought and answered and the patient voiced understanding. However, the patient continues to refuse admission. I have encouraged the patient to return to have their evaluation completed as we are glad to do so. I have also instructed the patient on the importance of follow-up and to return for any worsening or worrisome concerns. The patient appears competent to make medical decisions at this time. This is a very pleasant patient with abdominal pain without significant evidence of toxicity, shock, sepsis, hemodynamic or cardiopulmonary instability, acute appendicitis, acute cholecystitis, AAA, bowel obstruction, bowel perforation, herpes zoster, a cardiac or pulmonary etiology as a cause for the abdominal symptoms, or any disease process requiring other immediate surgical or medical intervention at this time. After treatment in the ER, patient had improvement of their symptoms. On repeat physical exam, patient has a benign abdominal exam.  Pain management and follow-up plan were discussed with the patient. Wells Criteria: To assess patient for likelihood of a pulmonary embolism. Physical findings suggestive of DVT (unilateral leg swelling, calf or thigh tenderness):+0 No  No alternative diagnosis better explains the illness:+0 No  Tachycardia with pulse > 100:+0 No  Immobilization (?3 days) or surgery in the previous four weeks:+0 No  Prior history of DVT or pulmonary embolism:+0 No  Presence of hemoptysis:+0 No  Presence of malignancy:+0 No    Pulmonary embolism risk score interpretation: 0. This falls under the following category: Score of < 2, which indicates a low probability    PERC Rule:  Applicable in this patient who has low clinical suspicion for pulmonary embolism.   Age < 48years old: Yes  Heart rate < 100 bpm: score: 3. This falls under the following category: Score of 4-6, which indicates low/moderate risk (12-16.6%) for major adverse cardiac event and supports observation with repeated troponins and/or non-invasive testing    THORACIC AORTIC DISSECTION SCREENING (TADS):    Associated New Neuro Deficies?: +0  No  Radial/Femoral Pulses Feel Uneven?: +0  No  Pain Maximal at Onset or Abrupt?: +0  No  Pain severe, ripping, or tearing?: +1 Yes  Migrates:  Chest, back, or abdomen?: +0  No  Chest XR Normal?: -1 Yes  A normal chest x-ray is defined as 1. Normal mediastinum, and 2. No pleural effusion, and 3. no apical pleural (curb density of the lung apex)    TADS score: < 0. This falls under the following category: Score of 0, odds of aortic dissection is <  1/1000, no further workup needed regarding the aorta and supports discharge    I engaged in a shared decision making discussion with the patient about the risk and potential benefits of CT scanning and they concurred with the plan to proceed without a CT scan as the risk is deemed a outweigh any potential benefit at this time. This is a very pleasant patient with chest pain. On physical exam, patient does not have any abnormal heart or lung sounds. The work up in the ED indicates patient is without significant evidence of acute coronary syndrome, pulmonary embolism, thoracic aortic dissection, pericarditis, pneumothorax, esophageal rupture, pneumonia, toxicity, shock, sepsis, unstable arrhythmia, hemodynamic or cardiopulmonary instability, herpes zoster, or any disease process requiring other immediate medical or surgical intervention at this time. It is understood that if the patient is not improving as expected or if other new symptoms or signs of concern develop, other etiologies or diagnoses may need to be considered requiring other tests, treatments, consultations, and/or admission.  The diagnosis, plan, expected course, follow-up, and return precautions were discussed and all questions were answered. Final Impression    1. Abdominal pain, unspecified abdominal location    2. Biliary colic    3. Abnormal EKG    4. Elevated blood pressure reading    5. Gallstones and inflammation of gallbladder without obstruction        Blood pressure (!) 169/93, pulse 83, temperature 98.6 °F (37 °C), temperature source Oral, resp. rate 17, weight 195 lb 12.3 oz (88.8 kg), SpO2 94 %. Patient and/or companions verbalized understanding of the ED workup, any relevant findings as well as any incidental findings, and the disposition and plan. Questions sought and answered with the patient and/or family members. They voice understanding and agree with plan. If the patient was discharged from the ED, they were instructed to return for any worsening or worrisome concerns. Patient was given scripts for the following medications. I counseled patient how to take these medications. New Prescriptions    AMOXICILLIN-CLAVULANATE (AUGMENTIN) 875-125 MG PER TABLET    Take 1 tablet by mouth 2 times daily for 10 days    ONDANSETRON (ZOFRAN ODT) 4 MG DISINTEGRATING TABLET    Take 1 tablet by mouth every 8 hours as needed for Nausea or Vomiting    OXYCODONE-ACETAMINOPHEN (PERCOCET) 5-325 MG PER TABLET    Take 1 tablet by mouth every 6 hours as needed for Pain for up to 7 days. Disposition  Pt is in stable condition upon Discharge to home. Please note, critical care time was 20 minutes, obtaining history, conducting a physical exam, performing and monitoring interventions, ordering, collecting and interpreting tests, and establishing medical decision-making and discussion with the patient and/or family, specifically for management of the presenting complaint and symptoms initially, direct bedside care, reevaluation, review of records, and consultation.   There was a high probability of clinically significant life-threatening deterioration in the patient's condition, which required

## 2019-05-15 NOTE — ED NOTES
Report called to  MIA[facility] SALAS   On floor    4 west   Cardiac monitor on  VSS, Afebrile, skin warm dry and intact, normal saline locked   Family updated on transfer            Eron Torres RN  05/15/19 2559

## 2019-05-16 ENCOUNTER — ANESTHESIA EVENT (OUTPATIENT)
Dept: OPERATING ROOM | Age: 44
DRG: 263 | End: 2019-05-16
Payer: COMMERCIAL

## 2019-05-16 ENCOUNTER — APPOINTMENT (OUTPATIENT)
Dept: GENERAL RADIOLOGY | Age: 44
DRG: 263 | End: 2019-05-16
Payer: COMMERCIAL

## 2019-05-16 ENCOUNTER — ANESTHESIA (OUTPATIENT)
Dept: ENDOSCOPY | Age: 44
DRG: 263 | End: 2019-05-16
Payer: COMMERCIAL

## 2019-05-16 ENCOUNTER — ANESTHESIA EVENT (OUTPATIENT)
Dept: ENDOSCOPY | Age: 44
DRG: 263 | End: 2019-05-16
Payer: COMMERCIAL

## 2019-05-16 VITALS
OXYGEN SATURATION: 90 % | DIASTOLIC BLOOD PRESSURE: 65 MMHG | SYSTOLIC BLOOD PRESSURE: 110 MMHG | RESPIRATION RATE: 9 BRPM

## 2019-05-16 LAB
A/G RATIO: 1 (ref 1.1–2.2)
ALBUMIN SERPL-MCNC: 3.5 G/DL (ref 3.4–5)
ALP BLD-CCNC: 168 U/L (ref 40–129)
ALT SERPL-CCNC: 683 U/L (ref 10–40)
ANION GAP SERPL CALCULATED.3IONS-SCNC: 14 MMOL/L (ref 3–16)
ANION GAP SERPL CALCULATED.3IONS-SCNC: 15 MMOL/L (ref 3–16)
AST SERPL-CCNC: 475 U/L (ref 15–37)
BANDED NEUTROPHILS RELATIVE PERCENT: 1 % (ref 0–7)
BASOPHILS ABSOLUTE: 0 K/UL (ref 0–0.2)
BASOPHILS RELATIVE PERCENT: 0 %
BILIRUB SERPL-MCNC: 5.1 MG/DL (ref 0–1)
BILIRUBIN URINE: ABNORMAL
BLOOD, URINE: NEGATIVE
BUN BLDV-MCNC: 8 MG/DL (ref 7–20)
BUN BLDV-MCNC: 9 MG/DL (ref 7–20)
CALCIUM SERPL-MCNC: 9.1 MG/DL (ref 8.3–10.6)
CALCIUM SERPL-MCNC: 9.3 MG/DL (ref 8.3–10.6)
CHLORIDE BLD-SCNC: 100 MMOL/L (ref 99–110)
CHLORIDE BLD-SCNC: 99 MMOL/L (ref 99–110)
CLARITY: CLEAR
CO2: 21 MMOL/L (ref 21–32)
CO2: 21 MMOL/L (ref 21–32)
COLOR: ABNORMAL
CREAT SERPL-MCNC: 0.8 MG/DL (ref 0.9–1.3)
CREAT SERPL-MCNC: 1 MG/DL (ref 0.9–1.3)
EKG ATRIAL RATE: 87 BPM
EKG DIAGNOSIS: NORMAL
EKG P AXIS: 59 DEGREES
EKG P-R INTERVAL: 128 MS
EKG Q-T INTERVAL: 378 MS
EKG QRS DURATION: 84 MS
EKG QTC CALCULATION (BAZETT): 454 MS
EKG R AXIS: 31 DEGREES
EKG T AXIS: 186 DEGREES
EKG VENTRICULAR RATE: 87 BPM
EOSINOPHILS ABSOLUTE: 0.2 K/UL (ref 0–0.6)
EOSINOPHILS RELATIVE PERCENT: 1 %
EPITHELIAL CELLS, UA: 0 /HPF (ref 0–5)
GFR AFRICAN AMERICAN: >60
GFR AFRICAN AMERICAN: >60
GFR NON-AFRICAN AMERICAN: >60
GFR NON-AFRICAN AMERICAN: >60
GLOBULIN: 3.4 G/DL
GLUCOSE BLD-MCNC: 101 MG/DL (ref 70–99)
GLUCOSE BLD-MCNC: 98 MG/DL (ref 70–99)
GLUCOSE URINE: NEGATIVE MG/DL
HCT VFR BLD CALC: 42.5 % (ref 40.5–52.5)
HEMOGLOBIN: 14.2 G/DL (ref 13.5–17.5)
HYALINE CASTS: 2 /LPF (ref 0–8)
INR BLD: 1.08 (ref 0.86–1.14)
KETONES, URINE: NEGATIVE MG/DL
LEUKOCYTE ESTERASE, URINE: ABNORMAL
LV EF: 70 %
LVEF MODALITY: NORMAL
LYMPHOCYTES ABSOLUTE: 1.1 K/UL (ref 1–5.1)
LYMPHOCYTES RELATIVE PERCENT: 6 %
MCH RBC QN AUTO: 29.4 PG (ref 26–34)
MCHC RBC AUTO-ENTMCNC: 33.3 G/DL (ref 31–36)
MCV RBC AUTO: 88.2 FL (ref 80–100)
MICROSCOPIC EXAMINATION: YES
MONOCYTES ABSOLUTE: 1.8 K/UL (ref 0–1.3)
MONOCYTES RELATIVE PERCENT: 10 %
NEUTROPHILS ABSOLUTE: 14.5 K/UL (ref 1.7–7.7)
NEUTROPHILS RELATIVE PERCENT: 82 %
NITRITE, URINE: NEGATIVE
PDW BLD-RTO: 13.7 % (ref 12.4–15.4)
PH UA: 5.5 (ref 5–8)
PLATELET # BLD: 221 K/UL (ref 135–450)
PMV BLD AUTO: 8.5 FL (ref 5–10.5)
POTASSIUM REFLEX MAGNESIUM: 3.8 MMOL/L (ref 3.5–5.1)
POTASSIUM SERPL-SCNC: 4.2 MMOL/L (ref 3.5–5.1)
PROTEIN UA: 30 MG/DL
PROTHROMBIN TIME: 12.3 SEC (ref 9.8–13)
RBC # BLD: 4.82 M/UL (ref 4.2–5.9)
RBC # BLD: NORMAL 10*6/UL
RBC UA: 3 /HPF (ref 0–4)
SODIUM BLD-SCNC: 134 MMOL/L (ref 136–145)
SODIUM BLD-SCNC: 136 MMOL/L (ref 136–145)
SPECIFIC GRAVITY UA: >1.03 (ref 1–1.03)
TOTAL PROTEIN: 6.9 G/DL (ref 6.4–8.2)
URINE REFLEX TO CULTURE: YES
URINE TYPE: ABNORMAL
UROBILINOGEN, URINE: 1 E.U./DL
WBC # BLD: 17.5 K/UL (ref 4–11)
WBC UA: 1 /HPF (ref 0–5)

## 2019-05-16 PROCEDURE — 2580000003 HC RX 258: Performed by: NURSE ANESTHETIST, CERTIFIED REGISTERED

## 2019-05-16 PROCEDURE — 3609015100 HC ERCP STENT PLACEMENT BILIARY/PANCREATIC DUCT: Performed by: INTERNAL MEDICINE

## 2019-05-16 PROCEDURE — 2720000010 HC SURG SUPPLY STERILE: Performed by: INTERNAL MEDICINE

## 2019-05-16 PROCEDURE — 3609014900 HC ERCP W/SPHINCTEROTOMY &/OR PAPILLOTOMY: Performed by: INTERNAL MEDICINE

## 2019-05-16 PROCEDURE — 6360000002 HC RX W HCPCS: Performed by: NURSE PRACTITIONER

## 2019-05-16 PROCEDURE — 6360000004 HC RX CONTRAST MEDICATION: Performed by: INTERNAL MEDICINE

## 2019-05-16 PROCEDURE — 0F778DZ DILATION OF COMMON HEPATIC DUCT WITH INTRALUMINAL DEVICE, VIA NATURAL OR ARTIFICIAL OPENING ENDOSCOPIC: ICD-10-PCS | Performed by: INTERNAL MEDICINE

## 2019-05-16 PROCEDURE — 2709999900 HC NON-CHARGEABLE SUPPLY: Performed by: INTERNAL MEDICINE

## 2019-05-16 PROCEDURE — 99254 IP/OBS CNSLTJ NEW/EST MOD 60: CPT | Performed by: SURGERY

## 2019-05-16 PROCEDURE — C8929 TTE W OR WO FOL WCON,DOPPLER: HCPCS

## 2019-05-16 PROCEDURE — 0F998ZZ DRAINAGE OF COMMON BILE DUCT, VIA NATURAL OR ARTIFICIAL OPENING ENDOSCOPIC: ICD-10-PCS | Performed by: INTERNAL MEDICINE

## 2019-05-16 PROCEDURE — 7100000000 HC PACU RECOVERY - FIRST 15 MIN: Performed by: INTERNAL MEDICINE

## 2019-05-16 PROCEDURE — 88112 CYTOPATH CELL ENHANCE TECH: CPT

## 2019-05-16 PROCEDURE — APPSS15 APP SPLIT SHARED TIME 0-15 MINUTES: Performed by: NURSE PRACTITIONER

## 2019-05-16 PROCEDURE — C1769 GUIDE WIRE: HCPCS | Performed by: INTERNAL MEDICINE

## 2019-05-16 PROCEDURE — C2625 STENT, NON-COR, TEM W/DEL SY: HCPCS | Performed by: INTERNAL MEDICINE

## 2019-05-16 PROCEDURE — 7100000001 HC PACU RECOVERY - ADDTL 15 MIN: Performed by: INTERNAL MEDICINE

## 2019-05-16 PROCEDURE — 3700000001 HC ADD 15 MINUTES (ANESTHESIA): Performed by: INTERNAL MEDICINE

## 2019-05-16 PROCEDURE — 80053 COMPREHEN METABOLIC PANEL: CPT

## 2019-05-16 PROCEDURE — 2500000003 HC RX 250 WO HCPCS: Performed by: NURSE PRACTITIONER

## 2019-05-16 PROCEDURE — 99223 1ST HOSP IP/OBS HIGH 75: CPT | Performed by: INTERNAL MEDICINE

## 2019-05-16 PROCEDURE — 3700000000 HC ANESTHESIA ATTENDED CARE: Performed by: INTERNAL MEDICINE

## 2019-05-16 PROCEDURE — 74328 X-RAY BILE DUCT ENDOSCOPY: CPT

## 2019-05-16 PROCEDURE — 2500000003 HC RX 250 WO HCPCS: Performed by: NURSE ANESTHETIST, CERTIFIED REGISTERED

## 2019-05-16 PROCEDURE — APPNB15 APP NON BILLABLE TIME 0-15 MINS: Performed by: NURSE PRACTITIONER

## 2019-05-16 PROCEDURE — 6360000002 HC RX W HCPCS: Performed by: INTERNAL MEDICINE

## 2019-05-16 PROCEDURE — 0F798DZ DILATION OF COMMON BILE DUCT WITH INTRALUMINAL DEVICE, VIA NATURAL OR ARTIFICIAL OPENING ENDOSCOPIC: ICD-10-PCS | Performed by: INTERNAL MEDICINE

## 2019-05-16 PROCEDURE — 3609015200 HC ERCP REMOVE CALCULI/DEBRIS BILIARY/PANCREAS DUCT: Performed by: INTERNAL MEDICINE

## 2019-05-16 PROCEDURE — 36415 COLL VENOUS BLD VENIPUNCTURE: CPT

## 2019-05-16 PROCEDURE — 6360000002 HC RX W HCPCS: Performed by: NURSE ANESTHETIST, CERTIFIED REGISTERED

## 2019-05-16 PROCEDURE — 81003 URINALYSIS AUTO W/O SCOPE: CPT

## 2019-05-16 PROCEDURE — 1200000000 HC SEMI PRIVATE

## 2019-05-16 PROCEDURE — 2500000003 HC RX 250 WO HCPCS: Performed by: INTERNAL MEDICINE

## 2019-05-16 PROCEDURE — 85025 COMPLETE CBC W/AUTO DIFF WBC: CPT

## 2019-05-16 PROCEDURE — 3609014200 HC ERCP W/BIOPSY SINGLE/MULTIPLE: Performed by: INTERNAL MEDICINE

## 2019-05-16 PROCEDURE — 6370000000 HC RX 637 (ALT 250 FOR IP): Performed by: INTERNAL MEDICINE

## 2019-05-16 DEVICE — BILIARY STENT WITH NAVIFLEXTM RX DELIVERY SYSTEM
Type: IMPLANTABLE DEVICE | Site: BILE DUCT | Status: FUNCTIONAL
Brand: ADVANIX™ BILIARY

## 2019-05-16 RX ORDER — LISINOPRIL 20 MG/1
20 TABLET ORAL DAILY
Status: DISCONTINUED | OUTPATIENT
Start: 2019-05-16 | End: 2019-05-16

## 2019-05-16 RX ORDER — FENTANYL CITRATE 50 UG/ML
INJECTION, SOLUTION INTRAMUSCULAR; INTRAVENOUS PRN
Status: DISCONTINUED | OUTPATIENT
Start: 2019-05-16 | End: 2019-05-16 | Stop reason: SDUPTHER

## 2019-05-16 RX ORDER — PROPOFOL 10 MG/ML
INJECTION, EMULSION INTRAVENOUS PRN
Status: DISCONTINUED | OUTPATIENT
Start: 2019-05-16 | End: 2019-05-16 | Stop reason: SDUPTHER

## 2019-05-16 RX ORDER — LISINOPRIL 10 MG/1
10 TABLET ORAL DAILY
Status: DISCONTINUED | OUTPATIENT
Start: 2019-05-16 | End: 2019-05-18 | Stop reason: HOSPADM

## 2019-05-16 RX ORDER — MIDAZOLAM HYDROCHLORIDE 1 MG/ML
INJECTION INTRAMUSCULAR; INTRAVENOUS PRN
Status: DISCONTINUED | OUTPATIENT
Start: 2019-05-16 | End: 2019-05-16 | Stop reason: SDUPTHER

## 2019-05-16 RX ORDER — SODIUM CHLORIDE 9 MG/ML
INJECTION, SOLUTION INTRAVENOUS CONTINUOUS PRN
Status: DISCONTINUED | OUTPATIENT
Start: 2019-05-16 | End: 2019-05-16 | Stop reason: SDUPTHER

## 2019-05-16 RX ORDER — ONDANSETRON 2 MG/ML
INJECTION INTRAMUSCULAR; INTRAVENOUS PRN
Status: DISCONTINUED | OUTPATIENT
Start: 2019-05-16 | End: 2019-05-16 | Stop reason: SDUPTHER

## 2019-05-16 RX ORDER — SUCCINYLCHOLINE/SOD CL,ISO/PF 200MG/10ML
SYRINGE (ML) INTRAVENOUS PRN
Status: DISCONTINUED | OUTPATIENT
Start: 2019-05-16 | End: 2019-05-16 | Stop reason: SDUPTHER

## 2019-05-16 RX ORDER — DEXAMETHASONE SODIUM PHOSPHATE 4 MG/ML
INJECTION, SOLUTION INTRA-ARTICULAR; INTRALESIONAL; INTRAMUSCULAR; INTRAVENOUS; SOFT TISSUE PRN
Status: DISCONTINUED | OUTPATIENT
Start: 2019-05-16 | End: 2019-05-16 | Stop reason: SDUPTHER

## 2019-05-16 RX ORDER — LIDOCAINE HYDROCHLORIDE 20 MG/ML
INJECTION, SOLUTION EPIDURAL; INFILTRATION; INTRACAUDAL; PERINEURAL PRN
Status: DISCONTINUED | OUTPATIENT
Start: 2019-05-16 | End: 2019-05-16 | Stop reason: SDUPTHER

## 2019-05-16 RX ADMIN — ONDANSETRON 4 MG: 2 INJECTION INTRAMUSCULAR; INTRAVENOUS at 16:30

## 2019-05-16 RX ADMIN — DEXAMETHASONE SODIUM PHOSPHATE 8 MG: 4 INJECTION, SOLUTION INTRAMUSCULAR; INTRAVENOUS at 16:30

## 2019-05-16 RX ADMIN — METRONIDAZOLE 500 MG: 500 INJECTION, SOLUTION INTRAVENOUS at 20:52

## 2019-05-16 RX ADMIN — PROPOFOL 170 MG: 10 INJECTION, EMULSION INTRAVENOUS at 16:13

## 2019-05-16 RX ADMIN — PERFLUTREN 1.5 ML: 6.52 INJECTION, SUSPENSION INTRAVENOUS at 08:50

## 2019-05-16 RX ADMIN — SODIUM CHLORIDE: 9 INJECTION, SOLUTION INTRAVENOUS at 15:42

## 2019-05-16 RX ADMIN — CIPROFLOXACIN 400 MG: 2 INJECTION, SOLUTION INTRAVENOUS at 00:07

## 2019-05-16 RX ADMIN — LIDOCAINE HYDROCHLORIDE 40 MG: 20 INJECTION, SOLUTION EPIDURAL; INFILTRATION; INTRACAUDAL; PERINEURAL at 16:13

## 2019-05-16 RX ADMIN — MORPHINE SULFATE 2 MG: 2 INJECTION, SOLUTION INTRAMUSCULAR; INTRAVENOUS at 02:41

## 2019-05-16 RX ADMIN — MIDAZOLAM 2 MG: 1 INJECTION INTRAMUSCULAR; INTRAVENOUS at 16:13

## 2019-05-16 RX ADMIN — CIPROFLOXACIN 400 MG: 2 INJECTION, SOLUTION INTRAVENOUS at 09:15

## 2019-05-16 RX ADMIN — LISINOPRIL 10 MG: 10 TABLET ORAL at 17:53

## 2019-05-16 RX ADMIN — FENTANYL CITRATE 100 MCG: 50 INJECTION INTRAMUSCULAR; INTRAVENOUS at 16:13

## 2019-05-16 RX ADMIN — METRONIDAZOLE 500 MG: 500 INJECTION, SOLUTION INTRAVENOUS at 06:20

## 2019-05-16 RX ADMIN — METRONIDAZOLE 500 MG: 500 INJECTION, SOLUTION INTRAVENOUS at 12:32

## 2019-05-16 RX ADMIN — Medication 100 MG: at 16:13

## 2019-05-16 ASSESSMENT — PULMONARY FUNCTION TESTS
PIF_VALUE: 28
PIF_VALUE: 3
PIF_VALUE: 36
PIF_VALUE: 35
PIF_VALUE: 35
PIF_VALUE: 1
PIF_VALUE: 4
PIF_VALUE: 21
PIF_VALUE: 1
PIF_VALUE: 36
PIF_VALUE: 35
PIF_VALUE: 13
PIF_VALUE: 1
PIF_VALUE: 23
PIF_VALUE: 0
PIF_VALUE: 32
PIF_VALUE: 33
PIF_VALUE: 28
PIF_VALUE: 3
PIF_VALUE: 23
PIF_VALUE: 36
PIF_VALUE: 2
PIF_VALUE: 39
PIF_VALUE: 33
PIF_VALUE: 30
PIF_VALUE: 23
PIF_VALUE: 34
PIF_VALUE: 0
PIF_VALUE: 35
PIF_VALUE: 36
PIF_VALUE: 11
PIF_VALUE: 1
PIF_VALUE: 36
PIF_VALUE: 25
PIF_VALUE: 1
PIF_VALUE: 36
PIF_VALUE: 1
PIF_VALUE: 0
PIF_VALUE: 30
PIF_VALUE: 33
PIF_VALUE: 0
PIF_VALUE: 37
PIF_VALUE: 31
PIF_VALUE: 2
PIF_VALUE: 38
PIF_VALUE: 4
PIF_VALUE: 30
PIF_VALUE: 2
PIF_VALUE: 33
PIF_VALUE: 30
PIF_VALUE: 34
PIF_VALUE: 11
PIF_VALUE: 41
PIF_VALUE: 35
PIF_VALUE: 28
PIF_VALUE: 1
PIF_VALUE: 3
PIF_VALUE: 34

## 2019-05-16 ASSESSMENT — PAIN SCALES - GENERAL
PAINLEVEL_OUTOF10: 0
PAINLEVEL_OUTOF10: 7
PAINLEVEL_OUTOF10: 0

## 2019-05-16 ASSESSMENT — PAIN - FUNCTIONAL ASSESSMENT: PAIN_FUNCTIONAL_ASSESSMENT: 0-10

## 2019-05-16 ASSESSMENT — PAIN DESCRIPTION - ORIENTATION: ORIENTATION: LOWER

## 2019-05-16 ASSESSMENT — PAIN DESCRIPTION - PAIN TYPE: TYPE: ACUTE PAIN

## 2019-05-16 ASSESSMENT — PAIN DESCRIPTION - LOCATION: LOCATION: ABDOMEN

## 2019-05-16 NOTE — CONSULTS
Referring Physician: Dr. Janice Rouse  Reason for Consultation: Preoperative risk assessment/LVH  Chief Complaint: abdominal pain    Subjective:   History of Present Illness:  Chetan Olivares is a 37 y.o. patient who presented to the hospital with complaints of acute RUQ abdominal pain with associated nausea and vomiting. He also reports a fever starting today. He was evaluated by GI and general surgery for choledocholithiasis. His is scheduled for ERCP today and possible cholecystectomy tomorrow. He has known LVH from previous testing at [facility] but he denies routine follow-up with cardiology. He believes his BP has been controlled on medications but had many years without routine health care. He denies RAMIREZ out of proportion to the level of exertion. He endorses intermittent palpitations described a \"racing\" sensation for a few seconds. Patient denies exertional chest pain/pressure, dyspnea at rest, PND, orthopnea, lightheadedness, weight changes, LE edema, and syncope. Past Medical History:   has a past medical history of Hyperlipidemia, Hypertension, and Sleep apnea. Surgical History:  Denies prior cardiac surgery. Social History:   reports that he has never smoked. He has never used smokeless tobacco. He reports that he does not drink alcohol or use drugs. Family History:  Denies premature CAD. Home Medications:  Were reviewed and are listed in nursing record and/or below  Prior to Admission medications    Medication Sig Start Date End Date Taking? Authorizing Provider   lisinopril (PRINIVIL;ZESTRIL) 10 MG tablet Take 10 mg by mouth daily    Yes Historical Provider, MD   amoxicillin-clavulanate (AUGMENTIN) 875-125 MG per tablet Take 1 tablet by mouth 2 times daily for 10 days 5/15/19 5/25/19 Yes Dylan Rangel MD   oxyCODONE-acetaminophen (PERCOCET) 5-325 MG per tablet Take 1 tablet by mouth every 6 hours as needed for Pain for up to 7 days.  5/15/19 5/22/19  Dylan Rangel MD   ondansetron (ZOFRAN ODT) 4 MG disintegrating tablet Take 1 tablet by mouth every 8 hours as needed for Nausea or Vomiting 5/15/19   Deandre Little MD        CURRENT Medications:    lisinopril (PRINIVIL;ZESTRIL) tablet 10 mg Daily   sodium chloride flush 0.9 % injection 10 mL 2 times per day   sodium chloride flush 0.9 % injection 10 mL PRN   magnesium hydroxide (MILK OF MAGNESIA) 400 MG/5ML suspension 30 mL Daily PRN   ondansetron (ZOFRAN) injection 4 mg Q6H PRN   enoxaparin (LOVENOX) injection 40 mg Daily   morphine (PF) injection 2 mg Q4H PRN   morphine (PF) injection 1 mg Q4H PRN   lactated ringers infusion Continuous   ciprofloxacin (CIPRO) IVPB 400 mg Q12H   metronidazole (FLAGYL) 500 mg in NaCl 100 mL IVPB premix Q8H       Allergies:  Patient has no known allergies. Review of Systems:   · Constitutional: no unanticipated weight loss. There's been no change in energy level, sleep pattern, or activity level. No fevers, chills. · Eyes: No visual changes or diplopia. No scleral icterus. · ENT: No Headaches, hearing loss or vertigo. No mouth sores or sore throat. · Cardiovascular: as reviewed in HPI  · Respiratory: No cough or wheezing, no sputum production. No hematemesis. · Gastrointestinal: No abdominal pain, appetite loss, blood in stools. No change in bowel or bladder habits. · Genitourinary: No dysuria, trouble voiding, or hematuria. · Musculoskeletal:  No gait disturbance, no joint complaints. · Integumentary: No rash or pruritis. · Neurological: No headache, diplopia, change in muscle strength, numbness or tingling. · Psychiatric: No anxiety or depression. · Endocrine: No temperature intolerance. No excessive thirst, fluid intake, or urination. No tremor. · Hematologic/Lymphatic: No abnormal bruising or bleeding, blood clots or swollen lymph nodes. · Allergic/Immunologic: No nasal congestion or hives.     Objective:   PHYSICAL EXAM:    Vitals:    05/16/19 1359   BP: 111/74   Pulse: 103   Resp: 17 Temp: 100.6 °F (38.1 °C)   SpO2: 94%    Weight: 200 lb 9.9 oz (91 kg)       General Appearance:  Alert, cooperative, no distress, appears stated age. Obese. Head:  Normocephalic, without obvious abnormality, atraumatic. Eyes:  Pupils equal and round. No scleral icterus. Mouth: Moist mucosa, no pharyngeal erythema. Nose: Nares normal. No drainage or sinus tenderness. Neck: Supple, symmetrical, trachea midline. No adenopathy. No tenderness/mass/nodules. No carotid bruit or elevated JVD. Lungs:   Clear to auscultation bilaterally, respirations unlabored. No wheeze, rales, or rhonchi. Chest Wall:  No tenderness or deformity. Heart:  Regular rate. S1/S2 audible. I/VI ANGELI. Abdomen:   Soft, non-tender, bowel sounds active. Musculoskeletal: No muscle wasting or digital clubbing. Extremities: Extremities normal, atraumatic. No cyanosis or edema. Pulses: 2+ radial and carotid pulses, symmetric. Skin: No rashes or lesions. Pysch: Normal mood and affect. Alert and oriented x 4.    Neurologic: Normal gross motor and sensory exam.       Labs     CBC: Lab Results   Component Value Date    WBC 17.5 05/16/2019    RBC 4.82 05/16/2019    HGB 14.2 05/16/2019    HCT 42.5 05/16/2019    MCV 88.2 05/16/2019    RDW 13.7 05/16/2019     05/16/2019     CMP:  Lab Results   Component Value Date     05/16/2019     05/16/2019    K 3.8 05/16/2019    K 4.2 05/16/2019    CL 99 05/16/2019     05/16/2019    CO2 21 05/16/2019    CO2 21 05/16/2019    BUN 9 05/16/2019    BUN 8 05/16/2019    CREATININE 1.0 05/16/2019    CREATININE 0.8 05/16/2019    GFRAA >60 05/16/2019    GFRAA >60 05/16/2019    AGRATIO 1.0 05/16/2019    LABGLOM >60 05/16/2019    LABGLOM >60 05/16/2019    GLUCOSE 101 05/16/2019    GLUCOSE 98 05/16/2019    PROT 6.9 05/16/2019    CALCIUM 9.1 05/16/2019    CALCIUM 9.3 05/16/2019    BILITOT 5.1 05/16/2019    ALKPHOS 168 05/16/2019     05/16/2019     05/16/2019     PT/INR:  No results found for: PTINR  HgBA1c:No results found for: LABA1C  Lab Results   Component Value Date    TROPONINI <0.01 05/15/2019       Cardiac Data     EK/15/19 NSR. LVH with repolarization abnormalities. Echo: 19   Overall left ventricular systolic function appears hyperdynamic. Ejection fraction is visually estimated to be >70%. There is severe concentric left ventricular hypertrophy. No regional wall motion abnormalities are noted. The right ventricle is normal in size and function. Possible systolic anterior motion (GOSIA) of anterior leaflet. Mild mitral regurgitation. There is a late peaking LV outflow tract gradient which significantly increases with valsalva (peak velocity increases from 2 to 5.5 m/s). Findings are suggestive of HOCM. Stress test: 3/2018  Pharmacologic vasodilator stress myocardial perfusion study without evidence of myocardial ischemia or infarct. Assessment and Plan   1) Pre-operative risk assessment. Patient is low cardiac risk based on RCRI score of zero. Patient's risk should not preclude him from proceeding with surgery. No additional cardiac testing is required prior to surgery. 2) Essential hypertension/Severe LVH. Goal BP <130/80. Assuming LVH is related to uncontrolled HTN but is not on multiple antihypertensives. Continue ACE-I. Will start B-blocker post-operatively. 3) Possible HOCM. Will consider outpatient MRI to evaluate for infiltrative disease. 4) Choledocholithiasis. GI and general surgery following. 5) Morbid obesity. BMI 40. Encouraged weight loss. Thank you for allowing us to participate in the care of [address] [place] [address] [facility].  Lui Lety, [address]  2019 2:23 PM

## 2019-05-16 NOTE — PROGRESS NOTES
Progress Note  Admit Date: 5/15/2019      PCP: No primary care provider on file. CC: F/U for acute cholecystitis     SUBJECTIVE / Interval History:   Pt with mild abdominal pain, no n/ v/         Allergies  Patient has no known allergies. Medications    Scheduled Meds:   sodium chloride flush  10 mL Intravenous 2 times per day    enoxaparin  40 mg Subcutaneous Daily    ciprofloxacin  400 mg Intravenous Q12H    metroNIDAZOLE  500 mg Intravenous Q8H       Continuous Infusions:   lactated ringers 150 mL/hr at 05/15/19 1830       PRN Meds:  sodium chloride flush, magnesium hydroxide, ondansetron, morphine, morphine    Vitals       Vitals:    05/16/19 0433   BP: 137/80   Pulse: 87   Resp: 18   Temp: 100 °F (37.8 °C)   SpO2: 92%         24HR INTAKE/OUTPUT:      Intake/Output Summary (Last 24 hours) at 5/16/2019 1328  Last data filed at 5/16/2019 0916  Gross per 24 hour   Intake 240 ml   Output 700 ml   Net -460 ml       Exam:    Gen: No distress. Eyes: PERRL. No sclera icterus. No conjunctival injection. ENT: No discharge. Pharynx clear. External appearance of ears and nose normal.  Neck: supple   Resp: Clear to auscultation. No crackles / Rhonchi. No wheezes. CV: Regular rate. Regular rhythm. No murmur or rub  GI: soft, Mild -tender. Non-distended. No hernia. BS +  Skin: Warm, dry, normal texture. No rashes   Lymph: No cervical LAD. No supraclavicular LAD. M/S: No joint deformity. Neuro: Moves all four extremities. CN 2-12 tested, no defect noted. Psych: Oriented x 3. No anxiety. Awake. Alert. Intact judgement and insight.     Data    LABS  CBC:   Recent Labs     05/15/19  0452 05/15/19  1308 05/16/19  0638   WBC 16.2* 14.4* 17.5*   HGB 15.3 15.2 14.2   HCT 45.1 45.5 42.5   MCV 88.2 88.8 88.2    250 221     BMP:   Recent Labs     05/15/19  0452 05/15/19  1308 05/16/19  0638    137 136  134*   K 4.1 3.9 4.2  3.8    99 100  99   CO2 22 23 21  21   BUN 12 10 8  9   CREATININE Every effort was made to ensure accuracy; however, inadvertent computerized transcription errors may be present.        Carolyn Pitts MD

## 2019-05-16 NOTE — PROGRESS NOTES
Pt resting in bed quietly. RR easy on room air. Pt denies abdominal pain or needs at this time. LR infusing at 150 cc/hr through RAC IV- site dry and intact. Call light in reach. Will monitor.

## 2019-05-16 NOTE — ANESTHESIA PRE PROCEDURE
suspension 30 mL  30 mL Oral Daily PRN Savanah Valdivia MD        [facility]ron [facility]) injection 4 mg  4 mg Intravenous Q6H PRN Savanah Valdivia MD        enoxaparin (LOVENOX) injection 40 mg  40 mg Subcutaneous Daily Savanah Valdivia MD   40 mg at 05/15/19 1830    morphine (PF) injection 2 mg  2 mg Intravenous Q4H PRN Savanah Valdivia MD   2 mg at 19 0241    morphine (PF) injection 1 mg  1 mg Intravenous Q4H PRN Savanah Valdivia MD        lactated ringers infusion   Intravenous Continuous Mao Quiñonez  mL/hr at 19 1344      ciprofloxacin (CIPRO) IVPB 400 mg  400 mg Intravenous Q12H Candie Pal, APRN - CNP   Stopped at 19 1015    metronidazole (FLAGYL) 500 mg in NaCl 100 mL IVPB premix  500 mg Intravenous Q8H Candie Pal, APRN - CNP   Stopped at 19 1344     Vital Signs (Current)   Vitals:    19 1359   BP: 111/74   Pulse: 103   Resp: 17   Temp: 100.6 °F (38.1 °C)   SpO2: 94%     Vital Signs Statistics (for past 48 hrs)     Temp  Av °F (37.2 °C)  Min: 98 °F (36.7 °C)   Min taken time: 05/15/19 1634  Max: 100.6 °F (38.1 °C)   Max taken time: 19 135  Pulse  Av.5  Min: 76   Min taken time: 05/15/19 0431  Max: 103   Max taken time: 19 1359  Resp  Av.9  Min: 6   Min taken time: 05/15/19 0757  Max: 29   Max taken time: 05/15/19 0431  BP  Min: 111/74   Min taken time: 19 1359  Max: 184/72   Max taken time: 05/15/19 1634  SpO2  Av.9 %  Min: 92 %   Min taken time: 19 0433  Max: 100 %   Max taken time: 05/15/19 0757    BP Readings from Last 3 Encounters:   19 111/74   05/15/19 (!) 156/91   10/18/17 (!) 142/92     BMI  Body mass index is 40.52 kg/m². Estimated body mass index is 40.52 kg/m² as calculated from the following:    Height as of this encounter: 4' 11\" (1.499 m). Weight as of this encounter: 200 lb 9.9 oz (91 kg).     CBC   Lab Results   Component Value Date    WBC 17.5 2019    RBC 4.82 2019    HGB 14.2 2019    HCT

## 2019-05-16 NOTE — PROGRESS NOTES
1702 pt arrived from [facility], vital signs stable, pt on 4L NC. Abdomen soft, rounded. Pt sedated, oral airway in place on 3L O2. Pt arousable to voice shortly after arrival, OA removed by CRNA. Placed on NC. Increased to 4L due to pt's minimal air movement.

## 2019-05-16 NOTE — OP NOTE
tomorrow. 2.  NPO after midnight. 3.  Will need repeat ERCP in 6 weeks for stent removal and re-evaluation of the biliary narrowing. I have sent a message to my office to set this up.    4.  GI consult service to follow    Lars Quesada MD  [address]  5/16/2019

## 2019-05-16 NOTE — PLAN OF CARE
Problem: Pain:  Goal: Pain level will decrease  Description  Pain level will decrease  5/15/2019 1757 by Britney Avila RN  Outcome: Ongoing   Pain/discomfort being managed with PRN analgesics per MD orders. Pt able to express presence and absence of pain and rate pain appropriately using numerical scale.     Problem: Infection:  Goal: Will remain free from infection  Description  Will remain free from infection  Outcome: Ongoing

## 2019-05-16 NOTE — CONSULTS
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 male with a PMH of HTN, HLD who presented on 5/15/2019 with acute onset right-sided abdominal pain with associated nausea, vomiting. It started 2 days ago. He was seen in the ER yesterday. CT showed cholelithiasis with possible early signs of cholecystitis. Blood work at that time showed normal LFTs and leukocytosis. Hospital admission was recommended but opted for discharge with antibiotics. He returned later in the evening with worsening pain. Repeat labs showed a significant rise in LFTs with bili of 5.1, Alk phos 168, , . We are consulted for possible choledocholithiasis. Surgery following as well and tentatively plans for cholecystectomy tomorrow. Of note, pt had some abnormal EKG findings in the ER showing inverted T waves in precordial leads. EKG was reviewed by Cardiology and low suspicion for STEMI. PAST MEDICAL, SURGICAL, FAMILY, and SOCIAL HISTORY     Past Medical History:   Diagnosis Date    Hyperlipidemia     Hypertension     Sleep apnea      History reviewed. No pertinent surgical history. History reviewed. No pertinent family history.   Social History     Socioeconomic History    Marital status:      Spouse name: None    Number of children: None    Years of education: None    Highest education level: None   Occupational History    None   Social Needs    Financial resource strain: None    Food insecurity:     Worry: None     Inability: None    Transportation needs:     Medical: None     Non-medical: None   Tobacco Use    Smoking status: Never Smoker    Smokeless tobacco: Never Used Substance and Sexual Activity    Alcohol use: No    Drug use: No    Sexual activity: None   Lifestyle    Physical activity:     Days per week: None     Minutes per session: None    Stress: None   Relationships    Social connections:     Talks on phone: None     Gets together: None     Attends Yarsanism service: None     Active member of club or organization: None     Attends meetings of clubs or organizations: None     Relationship status: None    Intimate partner violence:     Fear of current or ex partner: None     Emotionally abused: None     Physically abused: None     Forced sexual activity: None   Other Topics Concern    None   Social History Narrative    None       MEDICATIONS   SCHEDULED:    sodium chloride flush 10 mL 2 times per day   enoxaparin 40 mg Daily   ciprofloxacin 400 mg Q12H   metroNIDAZOLE 500 mg Q8H     FLUIDS/DRIPS:     lactated ringers 150 mL/hr at 05/15/19 1830     PRNs:   sodium chloride flush 10 mL PRN   magnesium hydroxide 30 mL Daily PRN   ondansetron 4 mg Q6H PRN   morphine 2 mg Q4H PRN   morphine 1 mg Q4H PRN     ALLERGIES:  He No Known Allergies    REVIEW OF SYSTEMS   Pertinent ROS noted in HPI    PHYSICAL EXAM   [unfilled]   I/O last 3 completed shifts: In: 240 [P.O.:240]  Out: 500 [Urine:500]      Physical Exam:  General appearance: alert, cooperative, no distress, appears stated age  Eyes: Anicteric  Head: Normocephalic, without obvious abnormality  Lungs: clear to auscultation bilaterally, Normal Effort  Heart: regular rate and rhythm, normal S1 and S2, no murmurs or rubs  Abdomen: soft, non-distended, non-tender. Bowel sounds normal. No masses,  no organomegaly.    Extremities: atraumatic, no cyanosis or edema  Skin: warm and dry, no jaundice  Neuro: Grossly intact, A&OX3      LABS AND IMAGING   Laboratory   Recent Labs     05/15/19  0452 05/15/19  1308 05/16/19  0638   WBC 16.2* 14.4* 17.5*   HGB 15.3 15.2 14.2   HCT 45.1 45.5 42.5   MCV 88.2 88.8 88.2    250 221     Recent Labs     05/15/19  0452 05/15/19  1308 05/16/19  0638    137 136  134*   K 4.1 3.9 4.2  3.8    99 100  99   CO2 22 23 21  21   BUN 12 10 8  9   CREATININE 1.3 1.2 0.8*  1.0     Recent Labs     05/15/19  0452 05/15/19  1308 05/16/19  0638   AST 30 111* 475*   ALT 25 92* 683*   BILITOT 0.7 1.3* 5.1*   ALKPHOS 69 75 168*     Recent Labs     05/15/19  0452 05/15/19  1308   LIPASE 35.0 168.0*     No results for input(s): PROTIME, INR in the last 72 hours. Imaging  US ABDOMEN LIMITED   Final Result   1. Cholelithiasis with equivocal cholecystitis. 2. Fatty infiltration of the liver with focal sparing. ASSESSMENT AND RECOMMENDATIONS   37 y.o. male with a PMH of HTN, HLD who presented on 5/15/2019 with acute onset right-sided abdominal pain with associated nausea, vomiting. CT showed cholelithiasis with possible early signs of cholecystitis. Labs showed a significant rise in LFTs with bili of 5.1, Alk phos 168, , . We are consulted for possible choledocholithiasis. IMPRESSION:    1. Cholelithiasis with possible choledocholithiasis. RECOMMENDATIONS:    Case discussed with Dr. Carmelita Arango. Will proceed with ERCP today. Keep NPO. INR pending. Platelet count WNL. No AC or antiplatelet therapy      If you have any questions or need any further information, please feel free to contact our consult team.  Thank you for allowing us to participate in the care of [address] [place] [address] XAVIER Peng PA-C

## 2019-05-16 NOTE — CONSULTS
[facility] and Vascular Surgery  [phone]        Surgery Consult      Pt Name: Amy Del Valle  MRN: [id number]  [place]: 1975  Date of evaluation: 5/16/2019  Primary Care Physician: No primary care provider on file. Patient evaluated at the request of  Dr. Khoa Bravo     Chief Complaint   Patient presents with    Abdominal Pain     seen earlier today. dx with early cholecysitis. Assessment/Plan   Acute cholecystitis, choledocholithiasis  -ERCP today with GI  -Plan for lap mckinley tomorrow  -Continue supportive care    Abnormal echo  -Cardiology consult pending    SUBJECTIVE:   History of Chief Complaint:    Amy Del Valle is a 37 y.o. male who presents with epigastric and right upper quadrant abdominal pain. Pain started abruptly yesterday where he presented to the ER. EKG was abnormal and a code STEMI was called. This was canceled by Dr. Rachna Trujillo after review of EKG. He then left the hospital stating he felt better. His mom told him to come back to the ER and finish the workup. CT 5:15 multiple gallstones, nonspecific gallbladder wall thickening. The right upper quadrant ultrasound and later in the day showed cholelithiasis and possible cholecystitis. He was admitted after the second trip to the ER, with plans to complete his cardiac workup and possible cholecystectomy. Bilirubin was elevated to 5 this morning, GI has been consulted and he is scheduled for an ERCP later this afternoon. Past Medical History   has a past medical history of Hyperlipidemia, Hypertension, and Sleep apnea. Past Surgical History   has no past surgical history on file. Medications  Prior to Admission medications    Medication Sig Start Date End Date Taking?  Authorizing Provider   lisinopril (PRINIVIL;ZESTRIL) 10 MG tablet Take 10 mg by mouth daily    Yes Historical Provider, MD   amoxicillin-clavulanate (AUGMENTIN) 875-125 MG per tablet Take 1 tablet by mouth 2 times daily for 10 days 5/15/19 5/25/19 Yes Rajesh Hobson MD   oxyCODONE-acetaminophen (PERCOCET) 5-325 MG per tablet Take 1 tablet by mouth every 6 hours as needed for Pain for up to 7 days. 5/15/19 5/22/19  Rajesh Hobson MD   ondansetron (ZOFRAN ODT) 4 MG disintegrating tablet Take 1 tablet by mouth every 8 hours as needed for Nausea or Vomiting 5/15/19   Rajesh Hobson MD    Scheduled Meds:   lisinopril  10 mg Oral Daily    sodium chloride flush  10 mL Intravenous 2 times per day    enoxaparin  40 mg Subcutaneous Daily    ciprofloxacin  400 mg Intravenous Q12H    metroNIDAZOLE  500 mg Intravenous Q8H     Continuous Infusions:   lactated ringers 100 mL/hr at 05/16/19 1344     PRN Meds:.sodium chloride flush, magnesium hydroxide, ondansetron, morphine, morphine    Allergies  has No Known Allergies. Family History  Reviewed, non contribtory  family history is not on file. Social History  Reviewed, non contributory   reports that he has never smoked. He has never used smokeless tobacco. He reports that he does not drink alcohol or use drugs. Review of Systems:  General Denies any fever or chills  HEENT Denies any diplopia, tinnitus or vertigo  Resp Denies any shortness of breath, cough or wheezing  Cardiac Denies any chest pain, palpitations, claudication or edema  GI Denies any melena, hematochezia, hematemesis or pyrosis   Denies any frequency, urgency, hesitancy or incontinence  Heme Denies bruising or bleeding easily  Endocrine Denies any history of diabetes or thyroid disease  Neuro Denies any focal motor or sensory deficits    OBJECTIVE:   VITALS:  height is 4' 11\" (1.499 m) and weight is 200 lb 9.9 oz (91 kg). His oral temperature is 100.6 °F (38.1 °C). His blood pressure is 111/74 and his pulse is 103. His respiration is 17 and oxygen saturation is 94%. CONSTITUTIONAL: Alert and oriented times 3, no acute distress and cooperative to examination with proper mood and affect.   SKIN: Skin color, texture, turgor APRN - CNP on 5/16/2019 at 2:24 PM      Attending    As per note above by Jaya Mccain  Patient was personally seen and examined by me today  Chart, labs and imaging reviewed    A/P  Cholecystitis   Stable on antibiotics   Had planned cholecystectomy today but now with increased bilirubin   ERCP planned today for presumed choledocholithiasis   Laparoscopic Cholecystectomy Friday assuming stable from ERCP   The risks, benefits and alternatives to the planned procedure were discussed. Patient expressed an  understanding and is willing to proceed.     Electronically signed by Emerald Mclaughlin MD on 5/16/2019 at 4:14 PM

## 2019-05-16 NOTE — H&P
[address] [place] [address]   Pre-operative History and Physical    Patient: Jacqueline Arreaga  : 1975  Acct#:     HISTORY OF PRESENT ILLNESS:    The patient is a 37 y.o. male who presents with acute right upper quadrant pain, nausea, vomiting, bili of 5.1, Alk phos 168, , . CT showed multiple gallstones with gallbladder wall thickening. Given the bili >4, high clinical suspicion for biliary stones so asked for ERCP    Past Medical History:        Diagnosis Date    Hyperlipidemia     Hypertension     Sleep apnea       Past Surgical History:    History reviewed. No pertinent surgical history. Medications Prior to Admission:   No current facility-administered medications on file prior to encounter. Current Outpatient Medications on File Prior to Encounter   Medication Sig Dispense Refill    lisinopril (PRINIVIL;ZESTRIL) 10 MG tablet Take 10 mg by mouth daily       amoxicillin-clavulanate (AUGMENTIN) 875-125 MG per tablet Take 1 tablet by mouth 2 times daily for 10 days 20 tablet 0    oxyCODONE-acetaminophen (PERCOCET) 5-325 MG per tablet Take 1 tablet by mouth every 6 hours as needed for Pain for up to 7 days. 12 tablet 0    ondansetron (ZOFRAN ODT) 4 MG disintegrating tablet Take 1 tablet by mouth every 8 hours as needed for Nausea or Vomiting 10 tablet 0        Allergies:  Patient has no known allergies.     Social History:   Social History     Socioeconomic History    Marital status:      Spouse name: Not on file    Number of children: Not on file    Years of education: Not on file    Highest education level: Not on file   Occupational History    Not on file   Social Needs    Financial resource strain: Not on file    Food insecurity:     Worry: Not on file     Inability: Not on file    Transportation needs:     Medical: Not on file     Non-medical: Not on file   Tobacco Use    Smoking status: Never Smoker    Smokeless tobacco: Never Used   Substance and Sexual

## 2019-05-17 ENCOUNTER — APPOINTMENT (OUTPATIENT)
Dept: GENERAL RADIOLOGY | Age: 44
DRG: 263 | End: 2019-05-17
Payer: COMMERCIAL

## 2019-05-17 ENCOUNTER — ANESTHESIA (OUTPATIENT)
Dept: OPERATING ROOM | Age: 44
DRG: 263 | End: 2019-05-17
Payer: COMMERCIAL

## 2019-05-17 VITALS
DIASTOLIC BLOOD PRESSURE: 66 MMHG | SYSTOLIC BLOOD PRESSURE: 101 MMHG | RESPIRATION RATE: 4 BRPM | TEMPERATURE: 98.4 F | OXYGEN SATURATION: 73 %

## 2019-05-17 LAB
A/G RATIO: 0.7 (ref 1.1–2.2)
ALBUMIN SERPL-MCNC: 2.8 G/DL (ref 3.4–5)
ALBUMIN SERPL-MCNC: 3 G/DL (ref 3.4–5)
ALP BLD-CCNC: 139 U/L (ref 40–129)
ALP BLD-CCNC: 155 U/L (ref 40–129)
ALT SERPL-CCNC: 327 U/L (ref 10–40)
ALT SERPL-CCNC: 372 U/L (ref 10–40)
ANION GAP SERPL CALCULATED.3IONS-SCNC: 12 MMOL/L (ref 3–16)
AST SERPL-CCNC: 123 U/L (ref 15–37)
AST SERPL-CCNC: 135 U/L (ref 15–37)
BILIRUB SERPL-MCNC: 0.9 MG/DL (ref 0–1)
BILIRUB SERPL-MCNC: 1.4 MG/DL (ref 0–1)
BILIRUBIN DIRECT: 0.6 MG/DL (ref 0–0.3)
BILIRUBIN, INDIRECT: 0.8 MG/DL (ref 0–1)
BUN BLDV-MCNC: 11 MG/DL (ref 7–20)
CALCIUM SERPL-MCNC: 8.4 MG/DL (ref 8.3–10.6)
CHLORIDE BLD-SCNC: 106 MMOL/L (ref 99–110)
CO2: 20 MMOL/L (ref 21–32)
CREAT SERPL-MCNC: 0.8 MG/DL (ref 0.9–1.3)
GFR AFRICAN AMERICAN: >60
GFR NON-AFRICAN AMERICAN: >60
GLOBULIN: 3.9 G/DL
GLUCOSE BLD-MCNC: 155 MG/DL (ref 70–99)
POTASSIUM REFLEX MAGNESIUM: 4.5 MMOL/L (ref 3.5–5.1)
SODIUM BLD-SCNC: 138 MMOL/L (ref 136–145)
TOTAL PROTEIN: 6.7 G/DL (ref 6.4–8.2)
TOTAL PROTEIN: 7.2 G/DL (ref 6.4–8.2)
URINE CULTURE, ROUTINE: NORMAL

## 2019-05-17 PROCEDURE — 47562 LAPAROSCOPIC CHOLECYSTECTOMY: CPT | Performed by: SURGERY

## 2019-05-17 PROCEDURE — 3600000014 HC SURGERY LEVEL 4 ADDTL 15MIN: Performed by: SURGERY

## 2019-05-17 PROCEDURE — 6360000002 HC RX W HCPCS: Performed by: INTERNAL MEDICINE

## 2019-05-17 PROCEDURE — 3700000000 HC ANESTHESIA ATTENDED CARE: Performed by: SURGERY

## 2019-05-17 PROCEDURE — 3600000004 HC SURGERY LEVEL 4 BASE: Performed by: SURGERY

## 2019-05-17 PROCEDURE — 6360000002 HC RX W HCPCS

## 2019-05-17 PROCEDURE — 36415 COLL VENOUS BLD VENIPUNCTURE: CPT

## 2019-05-17 PROCEDURE — 88304 TISSUE EXAM BY PATHOLOGIST: CPT

## 2019-05-17 PROCEDURE — 2580000003 HC RX 258: Performed by: ANESTHESIOLOGY

## 2019-05-17 PROCEDURE — 2500000003 HC RX 250 WO HCPCS: Performed by: SURGERY

## 2019-05-17 PROCEDURE — 80053 COMPREHEN METABOLIC PANEL: CPT

## 2019-05-17 PROCEDURE — 2580000003 HC RX 258: Performed by: SURGERY

## 2019-05-17 PROCEDURE — 7100000001 HC PACU RECOVERY - ADDTL 15 MIN: Performed by: SURGERY

## 2019-05-17 PROCEDURE — 2700000000 HC OXYGEN THERAPY PER DAY

## 2019-05-17 PROCEDURE — 2720000010 HC SURG SUPPLY STERILE: Performed by: SURGERY

## 2019-05-17 PROCEDURE — 0FT44ZZ RESECTION OF GALLBLADDER, PERCUTANEOUS ENDOSCOPIC APPROACH: ICD-10-PCS | Performed by: SURGERY

## 2019-05-17 PROCEDURE — 1200000000 HC SEMI PRIVATE

## 2019-05-17 PROCEDURE — 2500000003 HC RX 250 WO HCPCS: Performed by: INTERNAL MEDICINE

## 2019-05-17 PROCEDURE — 3700000001 HC ADD 15 MINUTES (ANESTHESIA): Performed by: SURGERY

## 2019-05-17 PROCEDURE — 2500000003 HC RX 250 WO HCPCS

## 2019-05-17 PROCEDURE — 7100000000 HC PACU RECOVERY - FIRST 15 MIN: Performed by: SURGERY

## 2019-05-17 PROCEDURE — 2500000003 HC RX 250 WO HCPCS: Performed by: ANESTHESIOLOGY

## 2019-05-17 PROCEDURE — 6370000000 HC RX 637 (ALT 250 FOR IP): Performed by: INTERNAL MEDICINE

## 2019-05-17 PROCEDURE — 3209999900 FLUORO FOR SURGICAL PROCEDURES

## 2019-05-17 PROCEDURE — 6360000002 HC RX W HCPCS: Performed by: SURGERY

## 2019-05-17 PROCEDURE — 2580000003 HC RX 258: Performed by: INTERNAL MEDICINE

## 2019-05-17 PROCEDURE — 94761 N-INVAS EAR/PLS OXIMETRY MLT: CPT

## 2019-05-17 PROCEDURE — 2709999900 HC NON-CHARGEABLE SUPPLY: Performed by: SURGERY

## 2019-05-17 RX ORDER — SODIUM CHLORIDE 9 MG/ML
INJECTION, SOLUTION INTRAVENOUS CONTINUOUS
Status: DISCONTINUED | OUTPATIENT
Start: 2019-05-17 | End: 2019-05-17 | Stop reason: SDUPTHER

## 2019-05-17 RX ORDER — SODIUM CHLORIDE 0.9 % (FLUSH) 0.9 %
10 SYRINGE (ML) INJECTION PRN
Status: DISCONTINUED | OUTPATIENT
Start: 2019-05-17 | End: 2019-05-17 | Stop reason: SDUPTHER

## 2019-05-17 RX ORDER — SODIUM CHLORIDE 0.9 % (FLUSH) 0.9 %
10 SYRINGE (ML) INJECTION EVERY 12 HOURS SCHEDULED
Status: DISCONTINUED | OUTPATIENT
Start: 2019-05-17 | End: 2019-05-17 | Stop reason: SDUPTHER

## 2019-05-17 RX ORDER — LIDOCAINE HYDROCHLORIDE 20 MG/ML
INJECTION, SOLUTION EPIDURAL; INFILTRATION; INTRACAUDAL; PERINEURAL PRN
Status: DISCONTINUED | OUTPATIENT
Start: 2019-05-17 | End: 2019-05-17 | Stop reason: SDUPTHER

## 2019-05-17 RX ORDER — MAGNESIUM HYDROXIDE 1200 MG/15ML
LIQUID ORAL CONTINUOUS PRN
Status: COMPLETED | OUTPATIENT
Start: 2019-05-17 | End: 2019-05-17

## 2019-05-17 RX ORDER — FENTANYL CITRATE 50 UG/ML
25 INJECTION, SOLUTION INTRAMUSCULAR; INTRAVENOUS EVERY 5 MIN PRN
Status: DISCONTINUED | OUTPATIENT
Start: 2019-05-17 | End: 2019-05-17

## 2019-05-17 RX ORDER — OXYCODONE HYDROCHLORIDE AND ACETAMINOPHEN 5; 325 MG/1; MG/1
2 TABLET ORAL PRN
Status: DISCONTINUED | OUTPATIENT
Start: 2019-05-17 | End: 2019-05-17

## 2019-05-17 RX ORDER — MIDAZOLAM HYDROCHLORIDE 1 MG/ML
INJECTION INTRAMUSCULAR; INTRAVENOUS PRN
Status: DISCONTINUED | OUTPATIENT
Start: 2019-05-17 | End: 2019-05-17 | Stop reason: SDUPTHER

## 2019-05-17 RX ORDER — OXYCODONE HYDROCHLORIDE AND ACETAMINOPHEN 5; 325 MG/1; MG/1
1 TABLET ORAL PRN
Status: DISCONTINUED | OUTPATIENT
Start: 2019-05-17 | End: 2019-05-17

## 2019-05-17 RX ORDER — MORPHINE SULFATE 2 MG/ML
1 INJECTION, SOLUTION INTRAMUSCULAR; INTRAVENOUS EVERY 5 MIN PRN
Status: DISCONTINUED | OUTPATIENT
Start: 2019-05-17 | End: 2019-05-17

## 2019-05-17 RX ORDER — ROCURONIUM BROMIDE 10 MG/ML
INJECTION, SOLUTION INTRAVENOUS PRN
Status: DISCONTINUED | OUTPATIENT
Start: 2019-05-17 | End: 2019-05-17 | Stop reason: SDUPTHER

## 2019-05-17 RX ORDER — SODIUM CHLORIDE 0.9 % (FLUSH) 0.9 %
10 SYRINGE (ML) INJECTION PRN
Status: DISCONTINUED | OUTPATIENT
Start: 2019-05-17 | End: 2019-05-18 | Stop reason: HOSPADM

## 2019-05-17 RX ORDER — ONDANSETRON 2 MG/ML
INJECTION INTRAMUSCULAR; INTRAVENOUS PRN
Status: DISCONTINUED | OUTPATIENT
Start: 2019-05-17 | End: 2019-05-17 | Stop reason: SDUPTHER

## 2019-05-17 RX ORDER — MEPERIDINE HYDROCHLORIDE 25 MG/ML
12.5 INJECTION INTRAMUSCULAR; INTRAVENOUS; SUBCUTANEOUS EVERY 5 MIN PRN
Status: DISCONTINUED | OUTPATIENT
Start: 2019-05-17 | End: 2019-05-17

## 2019-05-17 RX ORDER — SODIUM CHLORIDE 0.9 % (FLUSH) 0.9 %
10 SYRINGE (ML) INJECTION EVERY 12 HOURS SCHEDULED
Status: DISCONTINUED | OUTPATIENT
Start: 2019-05-17 | End: 2019-05-18 | Stop reason: HOSPADM

## 2019-05-17 RX ORDER — SODIUM CHLORIDE 9 MG/ML
INJECTION, SOLUTION INTRAVENOUS CONTINUOUS
Status: DISCONTINUED | OUTPATIENT
Start: 2019-05-17 | End: 2019-05-18 | Stop reason: HOSPADM

## 2019-05-17 RX ORDER — BUPIVACAINE HYDROCHLORIDE 5 MG/ML
INJECTION, SOLUTION EPIDURAL; INTRACAUDAL
Status: COMPLETED | OUTPATIENT
Start: 2019-05-17 | End: 2019-05-17

## 2019-05-17 RX ORDER — MORPHINE SULFATE 2 MG/ML
2 INJECTION, SOLUTION INTRAMUSCULAR; INTRAVENOUS EVERY 5 MIN PRN
Status: DISCONTINUED | OUTPATIENT
Start: 2019-05-17 | End: 2019-05-17

## 2019-05-17 RX ORDER — DEXAMETHASONE SODIUM PHOSPHATE 4 MG/ML
INJECTION, SOLUTION INTRA-ARTICULAR; INTRALESIONAL; INTRAMUSCULAR; INTRAVENOUS; SOFT TISSUE PRN
Status: DISCONTINUED | OUTPATIENT
Start: 2019-05-17 | End: 2019-05-17 | Stop reason: SDUPTHER

## 2019-05-17 RX ORDER — FENTANYL CITRATE 50 UG/ML
INJECTION, SOLUTION INTRAMUSCULAR; INTRAVENOUS PRN
Status: DISCONTINUED | OUTPATIENT
Start: 2019-05-17 | End: 2019-05-17 | Stop reason: SDUPTHER

## 2019-05-17 RX ORDER — PROPOFOL 10 MG/ML
INJECTION, EMULSION INTRAVENOUS PRN
Status: DISCONTINUED | OUTPATIENT
Start: 2019-05-17 | End: 2019-05-17 | Stop reason: SDUPTHER

## 2019-05-17 RX ORDER — LABETALOL HYDROCHLORIDE 5 MG/ML
INJECTION, SOLUTION INTRAVENOUS PRN
Status: DISCONTINUED | OUTPATIENT
Start: 2019-05-17 | End: 2019-05-17 | Stop reason: SDUPTHER

## 2019-05-17 RX ORDER — ONDANSETRON 2 MG/ML
4 INJECTION INTRAMUSCULAR; INTRAVENOUS
Status: DISCONTINUED | OUTPATIENT
Start: 2019-05-17 | End: 2019-05-17

## 2019-05-17 RX ORDER — FENTANYL CITRATE 50 UG/ML
50 INJECTION, SOLUTION INTRAMUSCULAR; INTRAVENOUS EVERY 5 MIN PRN
Status: DISCONTINUED | OUTPATIENT
Start: 2019-05-17 | End: 2019-05-17

## 2019-05-17 RX ORDER — OXYCODONE HYDROCHLORIDE AND ACETAMINOPHEN 5; 325 MG/1; MG/1
2 TABLET ORAL EVERY 4 HOURS PRN
Status: DISCONTINUED | OUTPATIENT
Start: 2019-05-17 | End: 2019-05-18 | Stop reason: HOSPADM

## 2019-05-17 RX ORDER — METOPROLOL SUCCINATE 25 MG/1
25 TABLET, EXTENDED RELEASE ORAL DAILY
Status: DISCONTINUED | OUTPATIENT
Start: 2019-05-18 | End: 2019-05-18 | Stop reason: HOSPADM

## 2019-05-17 RX ORDER — OXYCODONE HYDROCHLORIDE AND ACETAMINOPHEN 5; 325 MG/1; MG/1
1 TABLET ORAL EVERY 4 HOURS PRN
Status: DISCONTINUED | OUTPATIENT
Start: 2019-05-17 | End: 2019-05-18 | Stop reason: HOSPADM

## 2019-05-17 RX ADMIN — SUGAMMADEX 200 MG: 100 INJECTION, SOLUTION INTRAVENOUS at 10:16

## 2019-05-17 RX ADMIN — MIDAZOLAM 2 MG: 1 INJECTION INTRAMUSCULAR; INTRAVENOUS at 08:53

## 2019-05-17 RX ADMIN — ROCURONIUM BROMIDE 40 MG: 10 INJECTION INTRAVENOUS at 09:00

## 2019-05-17 RX ADMIN — SODIUM CHLORIDE: 9 INJECTION, SOLUTION INTRAVENOUS at 08:52

## 2019-05-17 RX ADMIN — SODIUM CHLORIDE: 9 INJECTION, SOLUTION INTRAVENOUS at 16:04

## 2019-05-17 RX ADMIN — METRONIDAZOLE 500 MG: 500 INJECTION, SOLUTION INTRAVENOUS at 14:09

## 2019-05-17 RX ADMIN — LIDOCAINE HYDROCHLORIDE 100 MG: 20 INJECTION, SOLUTION EPIDURAL; INFILTRATION; INTRACAUDAL; PERINEURAL at 09:00

## 2019-05-17 RX ADMIN — SODIUM CHLORIDE, POTASSIUM CHLORIDE, SODIUM LACTATE AND CALCIUM CHLORIDE: 600; 310; 30; 20 INJECTION, SOLUTION INTRAVENOUS at 12:33

## 2019-05-17 RX ADMIN — MORPHINE SULFATE 2 MG: 2 INJECTION, SOLUTION INTRAMUSCULAR; INTRAVENOUS at 17:37

## 2019-05-17 RX ADMIN — METRONIDAZOLE 500 MG: 500 INJECTION, SOLUTION INTRAVENOUS at 05:03

## 2019-05-17 RX ADMIN — LISINOPRIL 10 MG: 10 TABLET ORAL at 08:02

## 2019-05-17 RX ADMIN — LABETALOL HYDROCHLORIDE 5 MG: 5 INJECTION, SOLUTION INTRAVENOUS at 09:33

## 2019-05-17 RX ADMIN — METRONIDAZOLE 500 MG: 500 INJECTION, SOLUTION INTRAVENOUS at 20:34

## 2019-05-17 RX ADMIN — CIPROFLOXACIN 400 MG: 2 INJECTION, SOLUTION INTRAVENOUS at 00:31

## 2019-05-17 RX ADMIN — ONDANSETRON 4 MG: 2 INJECTION INTRAMUSCULAR; INTRAVENOUS at 10:03

## 2019-05-17 RX ADMIN — DEXAMETHASONE SODIUM PHOSPHATE 8 MG: 4 INJECTION, SOLUTION INTRAMUSCULAR; INTRAVENOUS at 09:08

## 2019-05-17 RX ADMIN — CIPROFLOXACIN 400 MG: 2 INJECTION, SOLUTION INTRAVENOUS at 13:10

## 2019-05-17 RX ADMIN — FENTANYL CITRATE 100 MCG: 50 INJECTION INTRAMUSCULAR; INTRAVENOUS at 08:55

## 2019-05-17 RX ADMIN — PROPOFOL 200 MG: 10 INJECTION, EMULSION INTRAVENOUS at 09:00

## 2019-05-17 RX ADMIN — SODIUM CHLORIDE: 9 INJECTION, SOLUTION INTRAVENOUS at 09:50

## 2019-05-17 RX ADMIN — FAMOTIDINE 20 MG: 10 INJECTION, SOLUTION INTRAVENOUS at 08:50

## 2019-05-17 ASSESSMENT — PULMONARY FUNCTION TESTS
PIF_VALUE: 21
PIF_VALUE: 21
PIF_VALUE: 27
PIF_VALUE: 28
PIF_VALUE: 29
PIF_VALUE: 20
PIF_VALUE: 15
PIF_VALUE: 1
PIF_VALUE: 26
PIF_VALUE: 27
PIF_VALUE: 15
PIF_VALUE: 29
PIF_VALUE: 27
PIF_VALUE: 28
PIF_VALUE: 19
PIF_VALUE: 27
PIF_VALUE: 29
PIF_VALUE: 28
PIF_VALUE: 14
PIF_VALUE: 28
PIF_VALUE: 14
PIF_VALUE: 4
PIF_VALUE: 25
PIF_VALUE: 28
PIF_VALUE: 16
PIF_VALUE: 28
PIF_VALUE: 29
PIF_VALUE: 1
PIF_VALUE: 26
PIF_VALUE: 28
PIF_VALUE: 20
PIF_VALUE: 29
PIF_VALUE: 21
PIF_VALUE: 16
PIF_VALUE: 20
PIF_VALUE: 15
PIF_VALUE: 26
PIF_VALUE: 18
PIF_VALUE: 26
PIF_VALUE: 18
PIF_VALUE: 29
PIF_VALUE: 27
PIF_VALUE: 19
PIF_VALUE: 26
PIF_VALUE: 19
PIF_VALUE: 29
PIF_VALUE: 29
PIF_VALUE: 19
PIF_VALUE: 29
PIF_VALUE: 28
PIF_VALUE: 17
PIF_VALUE: 13
PIF_VALUE: 29
PIF_VALUE: 31
PIF_VALUE: 27
PIF_VALUE: 21
PIF_VALUE: 28
PIF_VALUE: 27
PIF_VALUE: 21
PIF_VALUE: 29
PIF_VALUE: 29
PIF_VALUE: 26
PIF_VALUE: 4
PIF_VALUE: 2
PIF_VALUE: 4
PIF_VALUE: 14
PIF_VALUE: 27
PIF_VALUE: 29
PIF_VALUE: 28
PIF_VALUE: 28
PIF_VALUE: 25
PIF_VALUE: 28
PIF_VALUE: 6
PIF_VALUE: 20
PIF_VALUE: 15
PIF_VALUE: 1
PIF_VALUE: 20
PIF_VALUE: 26
PIF_VALUE: 0
PIF_VALUE: 28
PIF_VALUE: 27
PIF_VALUE: 14
PIF_VALUE: 28
PIF_VALUE: 22
PIF_VALUE: 16
PIF_VALUE: 20
PIF_VALUE: 28
PIF_VALUE: 28
PIF_VALUE: 29
PIF_VALUE: 20
PIF_VALUE: 1
PIF_VALUE: 28
PIF_VALUE: 28
PIF_VALUE: 20

## 2019-05-17 ASSESSMENT — PAIN - FUNCTIONAL ASSESSMENT
PAIN_FUNCTIONAL_ASSESSMENT: PREVENTS OR INTERFERES SOME ACTIVE ACTIVITIES AND ADLS
PAIN_FUNCTIONAL_ASSESSMENT: ACTIVITIES ARE NOT PREVENTED
PAIN_FUNCTIONAL_ASSESSMENT: 0-10

## 2019-05-17 ASSESSMENT — PAIN DESCRIPTION - LOCATION
LOCATION: ABDOMEN

## 2019-05-17 ASSESSMENT — PAIN DESCRIPTION - PROGRESSION
CLINICAL_PROGRESSION: GRADUALLY IMPROVING
CLINICAL_PROGRESSION: NOT CHANGED
CLINICAL_PROGRESSION: NOT CHANGED
CLINICAL_PROGRESSION: GRADUALLY IMPROVING

## 2019-05-17 ASSESSMENT — PAIN DESCRIPTION - DESCRIPTORS
DESCRIPTORS: DISCOMFORT;SHOOTING
DESCRIPTORS: DISCOMFORT;JABBING
DESCRIPTORS: DISCOMFORT;CRAMPING
DESCRIPTORS: DISCOMFORT

## 2019-05-17 ASSESSMENT — PAIN DESCRIPTION - ONSET
ONSET: GRADUAL
ONSET: GRADUAL
ONSET: ON-GOING
ONSET: ON-GOING

## 2019-05-17 ASSESSMENT — PAIN SCALES - GENERAL
PAINLEVEL_OUTOF10: 4
PAINLEVEL_OUTOF10: 0
PAINLEVEL_OUTOF10: 7
PAINLEVEL_OUTOF10: 2
PAINLEVEL_OUTOF10: 0
PAINLEVEL_OUTOF10: 4
PAINLEVEL_OUTOF10: 0
PAINLEVEL_OUTOF10: 4

## 2019-05-17 ASSESSMENT — PAIN DESCRIPTION - PAIN TYPE
TYPE: SURGICAL PAIN;ACUTE PAIN
TYPE: SURGICAL PAIN
TYPE: SURGICAL PAIN
TYPE: SURGICAL PAIN;ACUTE PAIN

## 2019-05-17 ASSESSMENT — PAIN DESCRIPTION - FREQUENCY
FREQUENCY: INTERMITTENT

## 2019-05-17 ASSESSMENT — PAIN DESCRIPTION - ORIENTATION
ORIENTATION: MID

## 2019-05-17 ASSESSMENT — LIFESTYLE VARIABLES: SMOKING_STATUS: 0

## 2019-05-17 NOTE — PROGRESS NOTES
Pt complaining of pain to abdomen, especially when coughing or moving in bed. Educated pt regarding how to support abdomen with pillow during ambulation or coughing. Pt denied need for PRN medication earlier in shift, but requested medication this evening. PRN med given (see eMAR). Pt states satisfaction with medication and denies any other needs at this time. Will continue to monitor.

## 2019-05-17 NOTE — BRIEF OP NOTE
Brief Postoperative Note  ______________________________________________________________    Patient: Keshia Cortez  YOB: 1975  MRN: [id number]  Date of Procedure: 5/17/2019    Pre-Op Diagnosis: cholecystitis    Post-Op Diagnosis: Gangrenous cholecystitis       Procedure(s):  LAPAROSCOPIC CHOLECYSTECTOMY    Anesthesia: General    Surgeon(s):  Jewell Lo MD    Assistant: Laparoscopic Cholecystectomy     Estimated Blood Loss (mL): 891     Complications: None    Specimens:   ID Type Source Tests Collected by Time Destination   A : A. Gallbladder  Tissue Gallbladder SURGICAL PATHOLOGY Jewell Lo MD 5/17/2019 2383        Implants:  * No implants in log *      Drains: * No LDAs found *    Findings: gangrenous cholecystitis, no complications    SAMMI Garcia MD  Date: 5/17/2019  Time: 10:22 AM

## 2019-05-17 NOTE — PLAN OF CARE
Problem: Pain:  Goal: Control of chronic pain  Description  Control of chronic pain  Outcome: Ongoing     Problem: Infection:  Goal: Will remain free from infection  Description  Will remain free from infection  Outcome: Ongoing

## 2019-05-17 NOTE — ANESTHESIA POSTPROCEDURE EVALUATION
Department of Anesthesiology  Postprocedure Note    Patient: Catalina Carvajal  MRN: [id number]  YOB: 1975  Date of evaluation: 5/17/2019  Time:  3:50 PM     Procedure Summary     Date:  05/17/19 Room / Location:  [address]    Anesthesia Start:  5568 Anesthesia Stop:  1042    Procedure:  LAPAROSCOPIC CHOLECYSTECTOMY (N/A Abdomen) Diagnosis:  (cholecystitis)    Surgeon:  Freddy Membreno MD Responsible Provider:  Eugenia Gardner MD    Anesthesia Type:  general ASA Status:  3          Anesthesia Type: general    Paige Phase I: Paige Score: 8    Paige Phase II:      Last vitals: Reviewed and per EMR flowsheets.    Vitals:    05/17/19 1120 05/17/19 1214 05/17/19 1518 05/17/19 1519   BP: 131/69 124/73 120/74    Pulse: 81 88 77    Resp: 12 14  16   Temp:  98.6 °F (37 °C) 98.1 °F (36.7 °C)    TempSrc:  Axillary Oral    SpO2: 92% 92% 93%    Weight:       Height:           Anesthesia Post Evaluation    Patient location during evaluation: bedside  Patient participation: complete - patient participated  Level of consciousness: awake and alert  Airway patency: patent  Nausea & Vomiting: no nausea  Complications: no  Cardiovascular status: hemodynamically stable  Respiratory status: acceptable  Hydration status: euvolemic

## 2019-05-17 NOTE — PROGRESS NOTES
Pt returned to unit from surgery. Breathing regular and unlabored, pt sleeping but wakes to voice. Pt denies needs at this time and states that he is comfortable. Pt's spouse at bedside. Will monitor.

## 2019-05-17 NOTE — PROGRESS NOTES
INPATIENT CONSULTATION:    IDENTIFYING DATA/REASON FOR CONSULTATION   PATIENT:  Chen Restrepo  MRN:  [id number]  ADMIT DATE: 5/15/2019  TIME OF EVALUATION: 5/17/2019 7:00 AM  HOSPITAL STAY:   LOS: 2 days   CONSULTING PHYSICIAN: Len Hess MD   REASON FOR CONSULTATION:    Subjective:    Patient reports he is feeling better. No further pain. Tolerated clear liquids yesterday after ERCP procedure    MEDICATIONS   SCHEDULED:    lisinopril 10 mg Daily   sodium chloride flush 10 mL 2 times per day   enoxaparin 40 mg Daily   ciprofloxacin 400 mg Q12H   metroNIDAZOLE 500 mg Q8H     FLUIDS/DRIPS:     lactated ringers 100 mL/hr at 05/16/19 1344     PRNs:   sodium chloride flush 10 mL PRN   magnesium hydroxide 30 mL Daily PRN   ondansetron 4 mg Q6H PRN   morphine 2 mg Q4H PRN   morphine 1 mg Q4H PRN     ALLERGIES:  No Known Allergies      PHYSICAL EXAM     Vitals:    05/16/19 1753 05/16/19 2043 05/16/19 2357 05/17/19 0509   BP: (!) 165/100 129/79 132/84 122/78   Pulse: 91 78 79 75   Resp: 12 18 16 16   Temp: 98.9 °F (37.2 °C) 100.1 °F (37.8 °C) 99.1 °F (37.3 °C) 98.6 °F (37 °C)   TempSrc: Oral Oral Oral Oral   SpO2: 94% 92% 95% 93%   Weight:    200 lb 9.9 oz (91 kg)   Height:           I/O last 3 completed shifts: In: 1603 [I.V.:1603]  Out: 700 [Urine:700]    Physical Exam:  General appearance: alert, cooperative, no distress, appears stated age  Eyes: Anicteric  Head: Normocephalic, without obvious abnormality  Lungs: clear to auscultation bilaterally, Normal Effort  Heart: regular rate and rhythm, normal S1 and S2, no murmurs or rubs  Abdomen: soft, non-distended, non-tender. Bowel sounds normal. No masses,  no organomegaly.    Extremities: atraumatic, no cyanosis or edema  Skin: warm and dry, no jaundice  Neuro: Grossly intact, A&OX3    LABS AND IMAGING   Laboratory   Recent Labs     05/15/19  0452 05/15/19  1308 05/16/19  0638   WBC 16.2* 14.4* 17.5*   HGB 15.3 15.2 14.2   HCT 45.1 45.5 42.5   MCV 88.2 88.8 88.2  250 221     Recent Labs     05/15/19  0452 05/15/19  1308 05/16/19  0638    137 136  134*   K 4.1 3.9 4.2  3.8    99 100  99   CO2 22 23 21  21   BUN 12 10 8  9   CREATININE 1.3 1.2 0.8*  1.0     Recent Labs     05/15/19  0452 05/15/19  1308 05/16/19  0638   AST 30 111* 475*   ALT 25 92* 683*   BILITOT 0.7 1.3* 5.1*   ALKPHOS 69 75 168*     Recent Labs     05/15/19  0452 05/15/19  1308   LIPASE 35.0 168.0*     Recent Labs     05/15/19  1308   PROTIME 12.3   INR 1.08         Imaging  FL ERCP BILIARY S&I   Final Result   ERCP was performed as described above. There is the appearance of possible   extrinsic narrowing of the upper portion of the common hepatic duct. 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 Biliary stent was placed. Please see the operative report by the operating physician regarding the   real-time imaging appearance. US ABDOMEN LIMITED   Final Result   1. Cholelithiasis with equivocal cholecystitis. 2. Fatty infiltration of the liver with focal sparing. 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 Cholangiogram and balloon sweeps after biliary sphincterotomy negative for stones. Next, at 10 Fr 9 cm Advantix biliary stent was placed. ASSESSMENT AND RECOMMENDATIONS   Courtier Sofia Nelson is a 37 y.o. male with PMH of HTN, HLD who presented on 5/15/2019 with acute onset right-sided abdominal pain with associated nausea, vomiting. CT showed cholelithiasis with possible early signs of cholecystitis. Labs showed a significant rise in LFTs with bili of 5.1, Alk phos 168, , . We were consulted for possible choledocholithiasis. ERCP completed 5/16- biliary narrowing in the mid common hepatic duct suggestive of extrinsic compression suspected d/t Mirizzi's. Neg for intraductal stones    1.  Biliary obstruction suspected 2/2

## 2019-05-17 NOTE — ANESTHESIA PRE PROCEDURE
Department of Anesthesiology  Preprocedure Note       Name:  Luis Fernando Sandhu   Age:  37 y.o.  :  1975                                          MRN:  [id number]         Date:  2019      Surgeon: Alyssa Marie):  Michaela Barrett MD    Procedure: LAPAROSCOPIC CHOLECYSTECTOMY, POSSIBLE CHOLANGIOGRAM, POSSIBLE OPEN WITH C-ARM (N/A Abdomen)    Medications prior to admission:   Prior to Admission medications    Medication Sig Start Date End Date Taking? Authorizing Provider   lisinopril (PRINIVIL;ZESTRIL) 10 MG tablet Take 10 mg by mouth daily    Yes Historical Provider, MD   amoxicillin-clavulanate (AUGMENTIN) 875-125 MG per tablet Take 1 tablet by mouth 2 times daily for 10 days 5/15/19 5/25/19 Yes Cruz Bellamy MD   oxyCODONE-acetaminophen (PERCOCET) 5-325 MG per tablet Take 1 tablet by mouth every 6 hours as needed for Pain for up to 7 days.  5/15/19 5/22/19  Cruz Bellamy MD   ondansetron (ZOFRAN ODT) 4 MG disintegrating tablet Take 1 tablet by mouth every 8 hours as needed for Nausea or Vomiting 5/15/19   Cruz Bellamy MD       Current medications:    Current Facility-Administered Medications   Medication Dose Route Frequency Provider Last Rate Last Dose    lisinopril (PRINIVIL;ZESTRIL) tablet 10 mg  10 mg Oral Daily Lanette Ramon MD   10 mg at 19    sodium chloride flush 0.9 % injection 10 mL  10 mL Intravenous 2 times per day Lanette Ramon MD   10 mL at 05/15/19 2035    sodium chloride flush 0.9 % injection 10 mL  10 mL Intravenous PRN Lanette Ramon MD   10 mL at 05/15/19 2028    magnesium hydroxide (MILK OF MAGNESIA) 400 MG/5ML suspension 30 mL  30 mL Oral Daily PRN Lanette Ramon MD        [facility]ron [facility] injection 4 mg  4 mg Intravenous Q6H PRN Lanette Ramon MD        enoxaparin (LOVENOX) injection 40 mg  40 mg Subcutaneous Daily Lanette Ramon MD   Stopped at 19 08    morphine (PF) injection 2 mg  2 mg Intravenous Q4H PRN Taryn Corado Minh Stock MD   2 mg at 05/16/19 0241    morphine (PF) injection 1 mg  1 mg Intravenous Q4H PRN Kimberly Ervin MD        lactated ringers infusion   Intravenous Continuous Kimberly Ervin  mL/hr at 05/16/19 1344      ciprofloxacin (CIPRO) IVPB 400 mg  400 mg Intravenous Q12H Kimberly Ervin MD   Stopped at 05/17/19 0200    metronidazole (FLAGYL) 500 mg in NaCl 100 mL IVPB premix  500 mg Intravenous Q8H Kimberly Ervin MD   Stopped at 05/17/19 6563       Allergies:  No Known Allergies    Problem List:    Patient Active Problem List   Diagnosis Code    Closed avulsion fracture of lateral malleolus of left fibula S82. 62XA    Acute cholecystitis K81.0    Pre-operative examination Z01.818    LVH (left ventricular hypertrophy) due to hypertensive disease, without heart failure I11.9    HOCM (hypertrophic obstructive cardiomyopathy) ([facility]) I42.1    Morbid obesity with BMI of 40.0-44.9, adult ([facility]) E66.01, Z68.41       Past Medical History:        Diagnosis Date    Hyperlipidemia     Hypertension     Sleep apnea        Past Surgical History:        Procedure Laterality Date    ERCP N/A 5/16/2019    ERCP SPHINCTER/PAPILLOTOMY performed by Kimberly Ervin MD at [address] ERCP N/A 5/16/2019    ERCP BIOPSY performed by Kimberly Ervin MD at [address] ERCP N/A 5/16/2019    ERCP STENT INSERTION performed by Kimberly Ervin MD at [address] ERCP N/A 5/16/2019    ERCP STONE REMOVAL VIA BALLOON SWEEP performed by Kimberly Ervin MD at [address] [place] [address] History:    Social History     Tobacco Use    Smoking status: Never Smoker    Smokeless tobacco: Never Used   Substance Use Topics    Alcohol use:  No                                Counseling given: Not Answered      Vital Signs (Current):   Vitals:    05/16/19 1753 05/16/19 2043 05/16/19 2357 05/17/19 0509   BP: (!) 165/100 129/79 132/84 122/78   Pulse: 91 78 79 75   Resp: 12 18 16 16   Temp: 98.9 °F (37.2 °C) 100.1 °F (37.8 °C) 99.1 °F (37.3 °C) 98.6 °F (37 °C)   TempSrc: Oral Oral Oral Oral   SpO2: 94% 92% 95% 93%   Weight:    200 lb 9.9 oz (91 kg)   Height:                                                  BP Readings from Last 3 Encounters:   05/17/19 122/78   05/16/19 110/65   05/15/19 (!) 156/91       NPO Status: Time of last liquid consumption: 2100                        Time of last solid consumption: 2100                        Date of last liquid consumption: 05/15/19                        Date of last solid food consumption: 05/15/19    BMI:   Wt Readings from Last 3 Encounters:   05/17/19 200 lb 9.9 oz (91 kg)   05/15/19 195 lb 12.3 oz (88.8 kg)   12/01/17 190 lb 0.6 oz (86.2 kg)     Body mass index is 40.52 kg/m². CBC:   Lab Results   Component Value Date    WBC 17.5 05/16/2019    RBC 4.82 05/16/2019    HGB 14.2 05/16/2019    HCT 42.5 05/16/2019    MCV 88.2 05/16/2019    RDW 13.7 05/16/2019     05/16/2019       CMP:   Lab Results   Component Value Date     05/16/2019     05/16/2019    K 3.8 05/16/2019    K 4.2 05/16/2019    CL 99 05/16/2019     05/16/2019    CO2 21 05/16/2019    CO2 21 05/16/2019    BUN 9 05/16/2019    BUN 8 05/16/2019    CREATININE 1.0 05/16/2019    CREATININE 0.8 05/16/2019    GFRAA >60 05/16/2019    GFRAA >60 05/16/2019    AGRATIO 1.0 05/16/2019    LABGLOM >60 05/16/2019    LABGLOM >60 05/16/2019    GLUCOSE 101 05/16/2019    GLUCOSE 98 05/16/2019    PROT 6.9 05/16/2019    CALCIUM 9.1 05/16/2019    CALCIUM 9.3 05/16/2019    BILITOT 5.1 05/16/2019    ALKPHOS 168 05/16/2019     05/16/2019     05/16/2019       POC Tests: No results for input(s): POCGLU, POCNA, POCK, POCCL, POCBUN, POCHEMO, POCHCT in the last 72 hours.     Coags:   Lab Results   Component Value Date    PROTIME 12.3 05/15/2019    INR 1.08 05/15/2019       HCG (If Applicable): No results found for: PREGTESTUR, PREGSERUM, HCG, HCGQUANT     ABGs: No results found for: PHART, PO2ART, KDA9NLL, COD8KIB, BEART, C7NIPZYV     Type & Screen (If Applicable):  No results found for: LABABO, [address]    Anesthesia Evaluation  Patient summary reviewed and Nursing notes reviewed no history of anesthetic complications:   Airway: Mallampati: II  TM distance: >3 FB   Neck ROM: full  Mouth opening: > = 3 FB Dental:          Pulmonary: breath sounds clear to auscultation  (+) sleep apnea: on CPAP,      (-) COPD, asthma and not a current smoker          Patient did not smoke on day of surgery. Cardiovascular:    (+) hypertension:, hyperlipidemia    (-) pacemaker, past MI, CAD, CABG/stent, dysrhythmias,  angina and  CHF    ECG reviewed  Rhythm: regular  Rate: normal  Echocardiogram reviewed  Stress test reviewed       Beta Blocker:  Not on Beta Blocker         Neuro/Psych:   Negative Neuro/Psych ROS     (-) seizures, TIA and CVA           GI/Hepatic/Renal:   (+) liver disease:, morbid obesity     (-) GERD and no renal disease       Endo/Other:    (+) no malignancy/cancer. (-) diabetes mellitus, hypothyroidism, hyperthyroidism, no malignancy/cancer               Abdominal:       Abdomen: soft and tender. Vascular: negative vascular ROS. Anesthesia Plan      general     ASA 3       Induction: intravenous and rapid sequence. MIPS: Postoperative opioids intended and Prophylactic antiemetics administered. Anesthetic plan and risks discussed with patient. Plan discussed with CRNA. This pre-anesthesia assessment may be used as a history and physical.    DOS STAFF ADDENDUM:    Pt seen and examined, chart reviewed (including anesthesia, drug and allergy history). No interval changes to history and physical examination. Anesthetic plan, risks, benefits, alternatives, and personnel involved discussed with patient. Patient verbalized an understanding and agrees to proceed.       Chidi Carrera MD  May 17, 2019  8:15 AM      Stephanie Cedillo Mitchell Jason MD   5/17/2019

## 2019-05-17 NOTE — H&P
Preoperative History and Physical Update    H&P from 5/15/2019 was reviewed    Cardiac status OK for surgery  ERCP and stent yesterday for extrinsic compression on CBD  Bilirubin improved today    PE  Alert and oriented  Mild tenderness RUQ    A/P  Cholecystitis  For Laparoscopic Cholecystectomy today    The risks, benefits and alternatives to the planned procedure were discussed. Patient expressed an understanding and is willing to proceed.     Electronically signed by Rene Alva MD on 5/17/2019 at 8:48 AM

## 2019-05-18 VITALS
DIASTOLIC BLOOD PRESSURE: 76 MMHG | BODY MASS INDEX: 40.73 KG/M2 | TEMPERATURE: 98.4 F | RESPIRATION RATE: 14 BRPM | WEIGHT: 207.45 LBS | HEIGHT: 60 IN | HEART RATE: 71 BPM | OXYGEN SATURATION: 96 % | SYSTOLIC BLOOD PRESSURE: 127 MMHG

## 2019-05-18 LAB
A/G RATIO: 0.9 (ref 1.1–2.2)
ALBUMIN SERPL-MCNC: 3.1 G/DL (ref 3.4–5)
ALP BLD-CCNC: 125 U/L (ref 40–129)
ALT SERPL-CCNC: 241 U/L (ref 10–40)
ANION GAP SERPL CALCULATED.3IONS-SCNC: 10 MMOL/L (ref 3–16)
AST SERPL-CCNC: 63 U/L (ref 15–37)
BILIRUB SERPL-MCNC: 0.5 MG/DL (ref 0–1)
BUN BLDV-MCNC: 13 MG/DL (ref 7–20)
CALCIUM SERPL-MCNC: 8.5 MG/DL (ref 8.3–10.6)
CHLORIDE BLD-SCNC: 108 MMOL/L (ref 99–110)
CO2: 22 MMOL/L (ref 21–32)
CREAT SERPL-MCNC: 0.8 MG/DL (ref 0.9–1.3)
GFR AFRICAN AMERICAN: >60
GFR NON-AFRICAN AMERICAN: >60
GLOBULIN: 3.3 G/DL
GLUCOSE BLD-MCNC: 108 MG/DL (ref 70–99)
HCT VFR BLD CALC: 36 % (ref 40.5–52.5)
HEMOGLOBIN: 11.9 G/DL (ref 13.5–17.5)
MCH RBC QN AUTO: 29.7 PG (ref 26–34)
MCHC RBC AUTO-ENTMCNC: 33 G/DL (ref 31–36)
MCV RBC AUTO: 90.1 FL (ref 80–100)
PDW BLD-RTO: 14.1 % (ref 12.4–15.4)
PLATELET # BLD: 209 K/UL (ref 135–450)
PMV BLD AUTO: 9 FL (ref 5–10.5)
POTASSIUM SERPL-SCNC: 4.4 MMOL/L (ref 3.5–5.1)
RBC # BLD: 4 M/UL (ref 4.2–5.9)
SODIUM BLD-SCNC: 140 MMOL/L (ref 136–145)
TOTAL PROTEIN: 6.4 G/DL (ref 6.4–8.2)
WBC # BLD: 15.8 K/UL (ref 4–11)

## 2019-05-18 PROCEDURE — 2580000003 HC RX 258: Performed by: SURGERY

## 2019-05-18 PROCEDURE — 2500000003 HC RX 250 WO HCPCS: Performed by: SURGERY

## 2019-05-18 PROCEDURE — 6370000000 HC RX 637 (ALT 250 FOR IP): Performed by: NURSE PRACTITIONER

## 2019-05-18 PROCEDURE — 85027 COMPLETE CBC AUTOMATED: CPT

## 2019-05-18 PROCEDURE — 94760 N-INVAS EAR/PLS OXIMETRY 1: CPT

## 2019-05-18 PROCEDURE — 6370000000 HC RX 637 (ALT 250 FOR IP): Performed by: SURGERY

## 2019-05-18 PROCEDURE — 6360000002 HC RX W HCPCS: Performed by: SURGERY

## 2019-05-18 PROCEDURE — 36415 COLL VENOUS BLD VENIPUNCTURE: CPT

## 2019-05-18 PROCEDURE — 80053 COMPREHEN METABOLIC PANEL: CPT

## 2019-05-18 PROCEDURE — 99024 POSTOP FOLLOW-UP VISIT: CPT | Performed by: SURGERY

## 2019-05-18 RX ORDER — POTASSIUM CHLORIDE 7.45 MG/ML
10 INJECTION INTRAVENOUS PRN
Status: DISCONTINUED | OUTPATIENT
Start: 2019-05-18 | End: 2019-05-18 | Stop reason: HOSPADM

## 2019-05-18 RX ORDER — MAGNESIUM SULFATE 1 G/100ML
1 INJECTION INTRAVENOUS PRN
Status: DISCONTINUED | OUTPATIENT
Start: 2019-05-18 | End: 2019-05-18 | Stop reason: HOSPADM

## 2019-05-18 RX ORDER — AMOXICILLIN AND CLAVULANATE POTASSIUM 875; 125 MG/1; MG/1
1 TABLET, FILM COATED ORAL 2 TIMES DAILY
Qty: 12 TABLET | Refills: 0 | Status: SHIPPED | OUTPATIENT
Start: 2019-05-18 | End: 2019-05-24

## 2019-05-18 RX ORDER — POTASSIUM CHLORIDE 20 MEQ/1
40 TABLET, EXTENDED RELEASE ORAL PRN
Status: DISCONTINUED | OUTPATIENT
Start: 2019-05-18 | End: 2019-05-18 | Stop reason: HOSPADM

## 2019-05-18 RX ORDER — SIMETHICONE 80 MG
80 TABLET,CHEWABLE ORAL EVERY 6 HOURS PRN
Status: DISCONTINUED | OUTPATIENT
Start: 2019-05-18 | End: 2019-05-18 | Stop reason: HOSPADM

## 2019-05-18 RX ORDER — OXYCODONE HYDROCHLORIDE AND ACETAMINOPHEN 5; 325 MG/1; MG/1
1-2 TABLET ORAL EVERY 6 HOURS PRN
Qty: 28 TABLET | Refills: 0 | Status: SHIPPED | OUTPATIENT
Start: 2019-05-18 | End: 2019-05-21

## 2019-05-18 RX ORDER — METOPROLOL SUCCINATE 25 MG/1
25 TABLET, EXTENDED RELEASE ORAL DAILY
Qty: 30 TABLET | Refills: 3 | Status: SHIPPED | OUTPATIENT
Start: 2019-05-19 | End: 2020-04-28 | Stop reason: SDUPTHER

## 2019-05-18 RX ADMIN — CIPROFLOXACIN 400 MG: 2 INJECTION, SOLUTION INTRAVENOUS at 00:41

## 2019-05-18 RX ADMIN — METRONIDAZOLE 500 MG: 500 INJECTION, SOLUTION INTRAVENOUS at 05:36

## 2019-05-18 RX ADMIN — ENOXAPARIN SODIUM 40 MG: 40 INJECTION SUBCUTANEOUS at 08:10

## 2019-05-18 RX ADMIN — SIMETHICONE CHEW TAB 80 MG 80 MG: 80 TABLET ORAL at 00:47

## 2019-05-18 RX ADMIN — LISINOPRIL 10 MG: 10 TABLET ORAL at 08:08

## 2019-05-18 RX ADMIN — SIMETHICONE CHEW TAB 80 MG 80 MG: 80 TABLET ORAL at 05:36

## 2019-05-18 RX ADMIN — METOPROLOL SUCCINATE 25 MG: 25 TABLET, EXTENDED RELEASE ORAL at 08:08

## 2019-05-18 RX ADMIN — SODIUM CHLORIDE: 9 INJECTION, SOLUTION INTRAVENOUS at 00:41

## 2019-05-18 ASSESSMENT — PAIN SCALES - GENERAL: PAINLEVEL_OUTOF10: 0

## 2019-05-18 NOTE — PLAN OF CARE
Problem: Pain:  Goal: Pain level will decrease  Description  Pain level will decrease  5/18/2019 0730 by Mai Mendoza RN  Outcome: Ongoing  5/17/2019 2256 by Loy Turpin RN  Outcome: Ongoing  Goal: Control of acute pain  Description  Control of acute pain  5/18/2019 0730 by Mai Mendoza RN  Outcome: Ongoing  5/17/2019 2256 by Loy Turpin RN  Outcome: Ongoing  Goal: Control of chronic pain  Description  Control of chronic pain  5/18/2019 0730 by Mai Mendoza RN  Outcome: Ongoing  5/17/2019 2256 by Loy Turpin RN  Outcome: Ongoing  Goal: Patient's pain/discomfort is manageable  Description  Patient's pain/discomfort is manageable  5/18/2019 0730 by Mai Mendoza RN  Outcome: Ongoing  5/17/2019 2256 by Loy Turpin RN  Outcome: Ongoing     Problem: Infection:  Goal: Will remain free from infection  Description  Will remain free from infection  5/18/2019 0730 by Mai Mendoza RN  Outcome: Ongoing  5/17/2019 2256 by Loy Turpin RN  Outcome: Ongoing

## 2019-05-18 NOTE — DISCHARGE SUMMARY
Hospital Medicine  Discharge Summary    Name:Courtier Maxim Corona       :  1975              MRN:  [id number]    Admit date:  5/15/2019    Discharge date:  2019    Admitting Physician:  Kalyani Chavez MD  Discharge Physician: Gaynelle Severin, MD      Discharge Diagnoses:    Patient Active Problem List   Diagnosis    Closed avulsion fracture of lateral malleolus of left fibula    Acute cholecystitis    Pre-operative examination    LVH (left ventricular hypertrophy) due to hypertensive disease, without heart failure    HOCM (hypertrophic obstructive cardiomyopathy) ([facility] [place] [address]    Morbid obesity with BMI of 40.0-44.9, adult [facility])       Admission Condition:  fair    Discharged Condition:  stable    The patient was seen and examined on day of discharge and this discharge summary is in conjunction with any daily progress note from day of discharge. History of Present Illness: 37 y.o. male who presented to [facility] [place] with right-sided abdominal pain. Patient states pain began yesterday and was sudden. Patient described the pain as sharp in character. Patient was seen here earlier during the day and was diagnosed with gallstones but patient left against medical advice. He now returns after his mother urged him to get treatment. Patient states that the pain has been intermittent and is not relate this pain to meals. Patient denies midsternal chest pain. Hospital Course:    Patient was admitted to the hospital and then started on supportive care with improvement in symptoms and please see below for further information.        Acute cholecystitis   -s/p Cholecystectomy for gangrenous cholecystitis   -Home with antibiotics   -Outpatient Surgery follow-up     Elevated brb, LFTs: improving   -Possible choledocholithiasis   -S/p ERCP   -Repeat ERCP in 6 weeks        HTN: BP elevated   -Cont home meds        Discharge Physical Exam:  Vitals: /76   Pulse 71   Temp 98.4 °F (36.9 °C) (Oral) Resp 14   Ht 4' 11\" (1.499 m)   Wt 207 lb 7.3 oz (94.1 kg)   SpO2 96%   BMI 41.90 kg/m²   General appearance: Laying in bed  Skin: Warm  HEENT: Head: Normocephalic, no lesions, without obvious abnormality. Neck: no adenopathy, no carotid bruit, no JVD, supple, symmetrical, trachea midline and thyroid not enlarged, symmetric, no tenderness/mass/nodules  Lungs: diminished breath sounds bilaterally  Heart: regular rate and rhythm  Abdomen: Dressing noted   Extremities: extremities normal, atraumatic, no cyanosis or edema  Neurologic: Mental status: Alert, oriented, thought content appropriate        Labs: For convenience and continuity at follow-up the following most recent labs are provided:       All labs past 24 hours:   Recent Results (from the past 24 hour(s))   Comprehensive Metabolic Panel w/ Reflex to MG    Collection Time: 05/17/19  3:13 PM   Result Value Ref Range    Sodium 138 136 - 145 mmol/L    Potassium reflex Magnesium 4.5 3.5 - 5.1 mmol/L    Chloride 106 99 - 110 mmol/L    CO2 20 (L) 21 - 32 mmol/L    Anion Gap 12 3 - 16    Glucose 155 (H) 70 - 99 mg/dL    BUN 11 7 - 20 mg/dL    CREATININE 0.8 (L) 0.9 - 1.3 mg/dL    GFR Non-African American >60 >60    GFR African American >60 >60    Calcium 8.4 8.3 - 10.6 mg/dL    Total Protein 6.7 6.4 - 8.2 g/dL    Alb 2.8 (L) 3.4 - 5.0 g/dL    Albumin/Globulin Ratio 0.7 (L) 1.1 - 2.2    Total Bilirubin 0.9 0.0 - 1.0 mg/dL    Alkaline Phosphatase 139 (H) 40 - 129 U/L     (H) 10 - 40 U/L     (H) 15 - 37 U/L    Globulin 3.9 g/dL   CBC    Collection Time: 05/18/19  9:11 AM   Result Value Ref Range    WBC 15.8 (H) 4.0 - 11.0 K/uL    RBC 4.00 (L) 4.20 - 5.90 M/uL    Hemoglobin 11.9 (L) 13.5 - 17.5 g/dL    Hematocrit 36.0 (L) 40.5 - 52.5 %    MCV 90.1 80.0 - 100.0 fL    MCH 29.7 26.0 - 34.0 pg    MCHC 33.0 31.0 - 36.0 g/dL    RDW 14.1 12.4 - 15.4 %    Platelets 221 986 - 990 K/uL    MPV 9.0 5.0 - 10.5 fL   Comprehensive Metabolic Panel    Collection Time: 05/18/19  9:11 AM   Result Value Ref Range    Sodium 140 136 - 145 mmol/L    Potassium 4.4 3.5 - 5.1 mmol/L    Chloride 108 99 - 110 mmol/L    CO2 22 21 - 32 mmol/L    Anion Gap 10 3 - 16    Glucose 108 (H) 70 - 99 mg/dL    BUN 13 7 - 20 mg/dL    CREATININE 0.8 (L) 0.9 - 1.3 mg/dL    GFR Non-African American >60 >60    GFR African American >60 >60    Calcium 8.5 8.3 - 10.6 mg/dL    Total Protein 6.4 6.4 - 8.2 g/dL    Alb 3.1 (L) 3.4 - 5.0 g/dL    Albumin/Globulin Ratio 0.9 (L) 1.1 - 2.2    Total Bilirubin 0.5 0.0 - 1.0 mg/dL    Alkaline Phosphatase 125 40 - 129 U/L     (H) 10 - 40 U/L    AST 63 (H) 15 - 37 U/L    Globulin 3.3 g/dL         Discharge Medications:    See medication reconciliation under discharge instructions. Job Horton   Home Medication Instructions MPF:[id number]    Printed on:05/18/19 1129   Medication Information                      amoxicillin-clavulanate (AUGMENTIN) 875-125 MG per tablet  Take 1 tablet by mouth 2 times daily for 6 days             lisinopril (PRINIVIL;ZESTRIL) 10 MG tablet  Take 10 mg by mouth daily              metoprolol succinate (TOPROL XL) 25 MG extended release tablet  Take 1 tablet by mouth daily             ondansetron (ZOFRAN ODT) 4 MG disintegrating tablet  Take 1 tablet by mouth every 8 hours as needed for Nausea or Vomiting             oxyCODONE-acetaminophen (PERCOCET) 5-325 MG per tablet  Take 1-2 tablets by mouth every 6 hours as needed (For moderate pain level 4-6) for up to 3 days. Consults:    IP CONSULT TO GENERAL SURGERY  IP CONSULT TO GENERAL SURGERY  IP CONSULT TO CARDIOLOGY    PCP: No primary care provider on file.     Significant Diagnostic Studies:      Xr Chest Standard (2 Vw)    Result Date: 5/15/2019  EXAMINATION: TWO XRAY VIEWS OF THE CHEST 5/15/2019 5:02 am COMPARISON: Chest radiographs 04/13/2014 HISTORY: ORDERING SYSTEM PROVIDED HISTORY: Chest pain TECHNOLOGIST PROVIDED HISTORY: Reason for exam:->Chest pain in the colon. No bowel obstruction. A few colonic diverticula are seen. There is of the colon are incompletely distended accentuating the wall thickness There is mild injection of the fat surrounding the splenic flexure. Pelvis: Bladder is incompletely distended accentuating its wall thickness. . Trace free fluid is seen in the pelvis Peritoneum/Retroperitoneum: Atherosclerotic change seen in aorta. No aortic aneurysm. Bones/Soft Tissues: Tiny periumbilical hernia containing fat is seen Spurring is seen in the spine     Multiple gallstones are seen. There is nonspecific gallbladder wall thickening. Consider early cholecystitis in the appropriate clinical scenario Wedge-shaped area of hypodensity seen within the left kidney. There is a nonspecific finding but could represent an early finding on underlying genitourinary tract infection. Correlate with urinalysis. Trace nonspecific free fluid in pelvis     Fl Ercp Biliary S&i    Result Date: 5/16/2019  EXAMINATION: SPOT IMAGES FROM AN ERCP COMPARISON: None FLUOROSCOPY DOSE OR TIME/IMAGES: 11 images were saved. 3 minutes and 52 seconds of fluoroscopy time utilized. HISTORY: ORDERING SYSTEM PROVIDED HISTORY: stent insertion TECHNOLOGIST PROVIDED HISTORY: Reason for exam:->stent insertion Ordering Physician Provided Reason for Exam: stent insertion;3 mins 52 secs fl time Acuity: Acute Type of Exam: Initial Relevant Medical/Surgical History: Acute cholecystitis FINDINGS: Endoscopy and cannulation of the major papilla was performed by the gastroenterology service under the supervision of Eleazar Harley Spot views are presented for interpretation. 11 images were saved. 3 minutes and 52 seconds of fluoroscopy time were utilized. Initial images demonstrate cannulation of the ampulla of Vater. There is subsequent retrograde filling of the common hepatic duct and common bile duct.   The images appear to demonstrate possibly extrinsic narrowing of the upper portion of the common hepatic duct. The images appear to demonstrated a balloon sweep with subsequent placement of a right upper quadrant stent. ERCP was performed as described above. There is the appearance of possible extrinsic narrowing of the upper portion of the common hepatic duct. 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 Biliary stent was placed. Please see the operative report by the operating physician regarding the real-time imaging appearance. Us Abdomen Limited    Result Date: 5/15/2019  EXAMINATION: RIGHT UPPER QUADRANT ULTRASOUND 5/15/2019 2:10 pm COMPARISON: None. HISTORY: ORDERING SYSTEM PROVIDED HISTORY: abdominal pain, r/o stones TECHNOLOGIST PROVIDED HISTORY: Right Upper Quadrant Reason for exam:->abdominal pain, r/o stones FINDINGS: LIVER:  The liver demonstrates increased echogenicity consistent with fatty infiltration. There is focal sparing noted in the region of the gallbladder fossa. BILIARY SYSTEM:  The gallbladder contains multiple shadowing echogenic gallstones with abnormal wall thickening of 4 mm noted. Common bile duct is within normal limits measuring 5 mm. RIGHT KIDNEY: The right kidney is grossly unremarkable without evidence of hydronephrosis. PANCREAS:  Visualized portions of the pancreas are unremarkable. OTHER: No evidence of right upper quadrant ascites. 1. Cholelithiasis with equivocal cholecystitis.  2. Fatty infiltration of the liver with focal sparing.     ================================================      DATE OF PROCEDURE:  05/17/2019     PREOPERATIVE DIAGNOSIS:  Cholecystitis.     POSTOPERATIVE DIAGNOSIS:  Gangrenous cholecystitis.     OPERATION PERFORMED:  Laparoscopic cholecystectomy.     SURGEON:  Andi Jordan MD     SPECIMENS:  Gallbladder.     COMPLICATIONS:  None.     DISPOSITION:  To recovery in stable condition.    ==========================================    [facility]  Endoscopy Note     Patient: Travis Loaiza duct that looked like extrinsic compression. The duct then returned to normal in the proximal common hepatic duct and the intrahepatics. Next, a 0.025\" Saturnino Stoney was passed without resistance into the bile duct and using the wire as a guide and using blended cautery, a 1cm biliary sphincterotomy was performed to the 12:00 position without complications. Next, several balloon sweeps were made with an extraction balloon negative for stones. After negative balloon sweeps, completion occlusion cholangiogram was performed which revealed no filling defects. Next, brushings were obtained from the narrowing of the bile duct. Next, a 10 Fr 9 cm Advantix biliary stent was placed with proximal aspect above the stricture in the intrahepatics and the distal aspect in the duodenum with external flap outside of the papilla. Contrast and bile drained.     Pancreatogram:  The pancreatic duct was not cannulated.     The cannulas were then withdrawn. The duodenum and stomach were decompressed and the scope was withdrawn from the patient. The patient tolerated the procedure well and was taken to the post anesthesia care unit in good condition.     Radiological Interpretation:  All fluoroscopic images and still x-ray films were carefullly examined and interpreted by the endoscopist on the spot during the procedure in the absence of radiologist.  These interpretations guided the course of the procedure and the interventions mentioned above and below.       Estimated blood loss: minimal  Specimens taken: yes        Impression:  1. 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 Cholangiogram and balloon sweeps after biliary sphincterotomy negative for stones. Next, at 10 Fr 9 cm Advantix biliary stent was placed. 3.  Will need repeat ERCP in 6 weeks for stent removal and re-evaluation of the biliary narrowing.   I have sent a message to my office to set this up. 4.  GI consult service to follow  Amando Vega 1160  5/16/2019                 Treatments:   See above    Patient Instructions: Activity: activity as tolerated  Diet: DIET GENERAL;  Wound Care: none needed    Follow-up with PCP on first available appointment.           Disposition:   home    Time spent on Discharged > 30 minutes      Signed:  Adela Barclay MD, FACP   5/18/2019, 11:29 AM  [phone]

## 2019-05-18 NOTE — DISCHARGE INSTR - COC
Continuity of Care Form    Patient Name: Heather Gant   :  1975  MRN:  [id number]    Admit date:  5/15/2019  Discharge date:  ***    Code Status Order: Full Code   Advance Directives:   Advance Care Flowsheet Documentation     Date/Time Healthcare Directive Type of Healthcare Directive Copy in [address] Agent's Name Healthcare Agent's Phone Number    19 0745  No, patient does not have an advance directive for healthcare treatment -- -- -- -- --    19 1422  No, patient does not have an advance directive for healthcare treatment -- -- -- -- --    05/15/19 1753  No, patient does not have an advance directive for healthcare treatment -- -- -- -- --          Admitting Physician:  Beena Robin MD  PCP: No primary care provider on file. Discharging Nurse: [facility] Unit/Room#: F0V-9390/6440-40  Discharging Unit Phone Number: ***    Emergency Contact:   Extended Emergency Contact Information  Primary Emergency Contact: Ilana Colon  Address: [address] [place] [address] Phone: [phone]  Mobile Phone: [phone]  Relation: Spouse  Secondary Emergency Contact: Nino [address] Phone: [phone]  Relation: Brother/Sister    Past Surgical History:  Past Surgical History:   Procedure Laterality Date    CHOLECYSTECTOMY, LAPAROSCOPIC N/A 2019    LAPAROSCOPIC CHOLECYSTECTOMY performed by Steve Christianson MD at [address] [facility] [address] ERCP N/A 2019    ERCP SPHINCTER/PAPILLOTOMY performed by Tito Silva MD at [address] ERCP N/A 2019    ERCP BIOPSY performed by Tito Silva MD at [address] ERCP N/A 2019    ERCP STENT INSERTION performed by Tito Silva MD at [address] ERCP N/A 2019    ERCP STONE REMOVAL VIA BALLOON SWEEP performed by Tito Silva MD at [facility] ENDOSCOPY       Immunization History:      There is no immunization history on file for this patient. Active Problems:  Patient Active Problem List   Diagnosis Code    Closed avulsion fracture of lateral malleolus of left fibula S82. 62XA    Acute cholecystitis K81.0    Pre-operative examination Z01.818    LVH (left ventricular hypertrophy) due to hypertensive disease, without heart failure I11.9    HOCM [facility]hypertrophic obstructive cardiomyopathy) ([facility] I42.1    Morbid obesity with BMI of 40.0-44.9, adult ([facility]) E66.01, Z68.41       Isolation/Infection:   Isolation          No Isolation            Nurse Assessment:  Last Vital Signs: /76   Pulse 71   Temp 98.4 °F (36.9 °C) (Oral)   Resp 14   Ht 4' 11\" (1.499 m)   Wt 207 lb 7.3 oz (94.1 kg)   SpO2 96%   BMI 41.90 kg/m²     Last documented pain score (0-10 scale): Pain Level: 0  Last Weight:   Wt Readings from Last 1 Encounters:   05/18/19 207 lb 7.3 oz (94.1 kg)     Mental Status:  {IP PT MENTAL STATUS:20030}    IV Access:  { NIK IV ACCESS:494874628}    Nursing Mobility/ADLs:  Walking   {P DME LHQL:402988571}  Transfer  {P DME QJDO:113199872}  Bathing  {[facility] DME KNWE:313764623}  Dressing  {P DME LDPF:904361385}  Toileting  {P DME SSQL:599910669}  Feeding  {[facility] DME PVCX:943655468}  Med Admin  {[facility] DME UMGI:939055247}  Med Delivery   { NIK MED Delivery:040695543}    Wound Care Documentation and Therapy:        Elimination:  Continence:   · Bowel: {YES / BH:30872}  · Bladder: {YES / KJ:47026}  Urinary Catheter: {Urinary Catheter:818556891}   Colostomy/Ileostomy/Ileal Conduit: {YES / ZM:19452}       Date of Last BM: ***    Intake/Output Summary (Last 24 hours) at 5/18/2019 1136  Last data filed at 5/17/2019 2034  Gross per 24 hour   Intake 480 ml   Output --   Net 480 ml     I/O last 3 completed shifts:   In: 2130 [P.O.:480; I.V.:1650]  Out: 48 [Blood:50]    Safety Concerns:     [address] Safety Concerns:723946242}    Impairments/Disabilities:      [address] Z[address] Impairments/Disabilities:007464566}    Nutrition Therapy:  Current Nutrition Therapy:   [address] NIK Diet List:801685408}    Routes of Feeding: {CHP DME Other Feedings:806822354}  Liquids: {Slp liquid thickness:24519}  Daily Fluid Restriction: {CHP DME Yes amt example:243352215}  Last Modified Barium Swallow with Video (Video Swallowing Test): {Done Not Done DTQZ:202589749}    Treatments at the Time of Hospital Discharge:   Respiratory Treatments: ***  Oxygen Therapy:  {Therapy; copd oxygen:40649}  Ventilator:    {[facility] Vent CGRM:339203194}    Rehab Therapies: {THERAPEUTIC INTERVENTION:3116487122}  Weight Bearing Status/Restrictions: {[facility] Weight Bearin}  Other Medical Equipment (for information only, NOT a DME order):  {EQUIPMENT:952999665}  Other Treatments: ***    Patient's personal belongings (please select all that are sent with patient):  {[facility] DME Belongings:000449823}    RN SIGNATURE:  {Esignature:947156994}    CASE MANAGEMENT/SOCIAL WORK SECTION    Inpatient Status Date: ***    Readmission Risk Assessment Score:  Readmission Risk              Risk of Unplanned Readmission:        10           Discharging to Facility/ Agency   · Name:   · Address:  · Phone:  · Fax:    Dialysis Facility (if applicable)   · Name:  · Address:  · Dialysis Schedule:  · Phone:  · Fax:    / signature: {Esignature:567205593}    PHYSICIAN SECTION    Prognosis: {Prognosis:7489344943}    Condition at Discharge: [address] Patient Condition:546063739}    Rehab Potential (if transferring to Rehab): {Prognosis:3237689964}    Recommended Labs or Other Treatments After Discharge: ***    Physician Certification: I certify the above information and transfer of Heather Gant  is necessary for the continuing treatment of the diagnosis listed and that he requires {Admit to Appropriate Level of Care:26250} for {GREATER/LESS:271780906} 30 days.      Update Admission H&P: {CHP DME Changes in EBUXP:841345954}    PHYSICIAN SIGNATURE:  {[facility]:289616664}

## 2019-05-18 NOTE — OP NOTE
again had to be extended slightly. The skin incisions  were all closed with 4-0 Vicryls. Dressings were applied, and the  patient was transferred to recovery in good condition.         Parisa Saucedo MD    D: 05/17/2019 15:37:34       T: 05/17/2019 15:47:59     [email or web address]  Job#: [id number]     Doc#: [id number]    CC:

## 2019-05-18 NOTE — PROGRESS NOTES
Discharge instructions discussed with Pt. IV removed without complications. Dry dressing applied. Pt ambulatory for discharge. Pt waiting on ride.

## 2019-05-18 NOTE — PROGRESS NOTES
Patient awake, resting in bed watching television. Reports slight discomfort and describes it as \"gas discomfort\". Requests medication for gas at this time. Denies the need for pain medication. Mylicon RX received from on call NP and administered to patient. Will continue to monitor.

## 2019-05-18 NOTE — PROGRESS NOTES
General Surgery  Daily Progress Note    Pt Name: Travis Loaiaz  Medical Record Number: [id number]  Date of Birth 1975   Today's Date: 5/18/2019    ASSESSMENT/PLAN  1. Gangrenous cholecystitis s/p laparoscopic cholecystectomy with cholangiogram POD #1 - doing well. Pain controlled. Tolerating general diet, passing flatus. Ok to discharge home per surgery. Continue augmentin x 6 days. Script for augmentin sent to pharmacy. Script for percocet on chart. Follow up with Dr. Jazzy Hathaway in 2 weeks, call for appointment. Post op instructions given. SUBJECTIVE  Courtier has improved from yesterday. Pain is well controlled. He has no nausea and no vomiting. He has passed flatus and has not had a bowel movement. He is tolerating regular diet. Current activity is ad daniella    OBJECTIVE  VITALS:  height is 4' 11\" (1.499 m) and weight is 207 lb 7.3 oz (94.1 kg). His oral temperature is 98.4 °F (36.9 °C). His blood pressure is 127/76 and his pulse is 71. His respiration is 14 and oxygen saturation is 96%. GENERAL: alert, no distress  LUNGS: normal respiratory effort, no accessory muscle use  HEART: normal rate and regular rhythm  ABDOMEN: soft, appropriately tender, dressings c/d/i  EXTREMITY: no cyanosis and no clubbing  I/O last 3 completed shifts: In: 2130 [P.O.:480; I.V.:1650]  Out: 48 [Blood:50]  No intake/output data recorded.     LABS  Recent Labs     05/15/19  1308 05/16/19  0020  05/17/19  0724  05/18/19  0911   WBC 14.4*  --    < >  --   --  15.8*   HGB 15.2  --    < >  --   --  11.9*   HCT 45.5  --    < >  --   --  36.0*     --    < >  --   --  209     --    < >  --    < > 140   K 3.9  --    < >  --    < > 4.4   CL 99  --    < >  --    < > 108   CO2 23  --    < >  --    < > 22   BUN 10  --    < >  --    < > 13   CREATININE 1.2  --    < >  --    < > 0.8*   CALCIUM 9.5  --    < >  --    < > 8.5   INR 1.08  --   --   --   --   --    *  --    < > 123*   < > 63*   ALT 92*  -- < > 372*   < > 241*   BILITOT 1.3*  --    < > 1.4*   < > 0.5   BILIDIR  --   --   --  0.6*  --   --    NITRU  --  Negative  --   --   --   --    COLORU  --  EDU*  --   --   --   --     < > = values in this interval not displayed.      CBC with Differential:    Lab Results   Component Value Date    WBC 15.8 05/18/2019    RBC 4.00 05/18/2019    HGB 11.9 05/18/2019    HCT 36.0 05/18/2019     05/18/2019    MCV 90.1 05/18/2019    MCH 29.7 05/18/2019    MCHC 33.0 05/18/2019    RDW 14.1 05/18/2019    BANDSPCT 1 05/16/2019    LYMPHOPCT 6.0 05/16/2019    MONOPCT 10.0 05/16/2019    BASOPCT 0.0 05/16/2019    MONOSABS 1.8 05/16/2019    LYMPHSABS 1.1 05/16/2019    EOSABS 0.2 05/16/2019    BASOSABS 0.0 05/16/2019     BMP:    Lab Results   Component Value Date     05/18/2019    K 4.4 05/18/2019    K 4.5 05/17/2019     05/18/2019    CO2 22 05/18/2019    BUN 13 05/18/2019    LABALBU 3.1 05/18/2019    CREATININE 0.8 05/18/2019    CALCIUM 8.5 05/18/2019    GFRAA >60 05/18/2019    LABGLOM >60 05/18/2019    GLUCOSE 108 05/18/2019     Magnesium:  No results found for: MG  Phosphorus:  No results found for: Derrick Wang MD  Electronically signed 5/18/2019 at 10:42 AM

## 2019-05-18 NOTE — PROGRESS NOTES
Progress Note  Admit Date: 5/15/2019      PCP: No primary care provider on file. CC: F/U for acute cholecystitis     SUBJECTIVE / Interval History:   Pt doing well post op         Allergies  Patient has no known allergies. Medications    Scheduled Meds:   sodium chloride flush  10 mL Intravenous 2 times per day    [START ON 5/18/2019] metoprolol succinate  25 mg Oral Daily    lisinopril  10 mg Oral Daily    enoxaparin  40 mg Subcutaneous Daily    ciprofloxacin  400 mg Intravenous Q12H    metroNIDAZOLE  500 mg Intravenous Q8H       Continuous Infusions:   sodium chloride 125 mL/hr at 05/17/19 1604       PRN Meds:  sodium chloride flush, oxyCODONE-acetaminophen **OR** oxyCODONE-acetaminophen, magnesium hydroxide, ondansetron, morphine, morphine    Vitals       Vitals:    05/17/19 1748   BP: 136/87   Pulse: 74   Resp: 16   Temp: 99.5 °F (37.5 °C)   SpO2: 94%         24HR INTAKE/OUTPUT:      Intake/Output Summary (Last 24 hours) at 5/17/2019 2113  Last data filed at 5/17/2019 1112  Gross per 24 hour   Intake 1650 ml   Output 50 ml   Net 1600 ml       Exam:    Gen: No distress. Eyes: PERRL. No sclera icterus. No conjunctival injection. ENT: No discharge. Pharynx clear. External appearance of ears and nose normal.  Neck: supple   Resp: Clear to auscultation. No crackles / Rhonchi. No wheezes. CV: Regular rate. Regular rhythm. No murmur or rub  GI: soft, Non-tender. Non-distended. No hernia. BS +, abdominal incision sites c/d /I   Skin: Warm, dry, normal texture. No rashes   Lymph: No cervical LAD. No supraclavicular LAD. M/S: No joint deformity. Neuro: Moves all four extremities. CN 2-12 tested, no defect noted. Psych: Oriented x 3. No anxiety. Awake. Alert. Intact judgement and insight.     Data    LABS  CBC:   Recent Labs     05/15/19  0452 05/15/19  1308 05/16/19  0638   WBC 16.2* 14.4* 17.5*   HGB 15.3 15.2 14.2   HCT 45.1 45.5 42.5   MCV 88.2 88.8 88.2    250 221     BMP:   Recent Problems:    Acute cholecystitis    Pre-operative examination    LVH (left ventricular hypertrophy) due to hypertensive disease, without heart failure    HOCM (hypertrophic obstructive cardiomyopathy) (HCC)    Morbid obesity with BMI of 40.0-44.9, adult ([facility] [address]  Resolved Problems:    * No resolved hospital problems. *    Acute cholecystitis   -s/p Cholecystectomy   -IVF, pain control   -Cipro and flagyl   -Gen surg on board      Elevated brb, LFTs: improving   -Possible choledocholithiasis   -S/p ERCP , GI on board   -Repeat ERCP in 6 weeks      HTN: BP elevated   -will restart home lisinopril that was held during admission      The patient and / or the family were informed of the results of any tests, a time was given to answer questions, a plan was proposed and they agreed with plan. Discussed with consulting physicians, nursing and social work     The note was completed using EMR. Every effort was made to ensure accuracy; however, inadvertent computerized transcription errors may be present.        Madi Cope MD

## 2019-06-11 ENCOUNTER — OFFICE VISIT (OUTPATIENT)
Dept: CARDIOLOGY CLINIC | Age: 44
End: 2019-06-11
Payer: COMMERCIAL

## 2019-06-11 VITALS
OXYGEN SATURATION: 98 % | BODY MASS INDEX: 35.68 KG/M2 | WEIGHT: 189 LBS | DIASTOLIC BLOOD PRESSURE: 80 MMHG | HEART RATE: 69 BPM | HEIGHT: 61 IN | SYSTOLIC BLOOD PRESSURE: 122 MMHG

## 2019-06-11 DIAGNOSIS — I42.1 HOCM (HYPERTROPHIC OBSTRUCTIVE CARDIOMYOPATHY) (HCC): Primary | ICD-10-CM

## 2019-06-11 DIAGNOSIS — I10 ESSENTIAL HYPERTENSION: ICD-10-CM

## 2019-06-11 DIAGNOSIS — I51.7 LVH (LEFT VENTRICULAR HYPERTROPHY): ICD-10-CM

## 2019-06-11 DIAGNOSIS — I34.0 NON-RHEUMATIC MITRAL REGURGITATION: ICD-10-CM

## 2019-06-11 PROCEDURE — 99214 OFFICE O/P EST MOD 30 MIN: CPT | Performed by: INTERNAL MEDICINE

## 2019-06-11 PROCEDURE — 1036F TOBACCO NON-USER: CPT | Performed by: INTERNAL MEDICINE

## 2019-06-11 PROCEDURE — 1111F DSCHRG MED/CURRENT MED MERGE: CPT | Performed by: INTERNAL MEDICINE

## 2019-06-11 PROCEDURE — G8417 CALC BMI ABV UP PARAM F/U: HCPCS | Performed by: INTERNAL MEDICINE

## 2019-06-11 PROCEDURE — 93000 ELECTROCARDIOGRAM COMPLETE: CPT | Performed by: INTERNAL MEDICINE

## 2019-06-11 PROCEDURE — G8427 DOCREV CUR MEDS BY ELIG CLIN: HCPCS | Performed by: INTERNAL MEDICINE

## 2019-06-11 NOTE — PROGRESS NOTES
[address]      Cardiology Consult    Courtier Nino Son  1975 June 11, 2019      Reason for Referral: LVH, possible HOCM     CC: \"I'm feeling pretty good. \"     HPI:  The patient is 37 y.o. male with a past medical history significant for HTN/LVH who presents today for treatment of LVH/possible HOCM. He presented to the hospital with complaints of acute RUQ abdominal pain with associated nausea and vomiting. He also reported a fever and was evaluated by GI and general surgery for choledocholithiasis. Cardiology was originally consulted for a pre-operative risk assessment for an abnormal EKG. He underwent an ERCP 5/16/19 and cholecystectomy 5/17/19. He had known LVH from previous testing at [facility] but he denied routine follow-up with cardiology. He believed his BP had been controlled on medications but had many years without routine health care. He completed an echocardiogram that showed severe LVH and possible HOCM. Today, he denies any cardiac complaints. He reports compliance with is medications and tolerating. He denies any lightheadedness or syncope. He monitors his blood pressure at home and states it is controlled. Patient denies exertional chest pain/pressure, dyspnea at rest, worsening RAMIREZ, PND, orthopnea, palpitations, lightheadedness, weight changes, changes in LE edema, and syncope.     Past Medical History:   Diagnosis Date    Hyperlipidemia     Hypertension     LVH (left ventricular hypertrophy)     Sleep apnea      Past Surgical History:   Procedure Laterality Date    CHOLECYSTECTOMY, LAPAROSCOPIC N/A 5/17/2019    LAPAROSCOPIC CHOLECYSTECTOMY performed by Renee Jenkins MD at [facility] ERCP N/A 5/16/2019    ERCP SPHINCTER/PAPILLOTOMY performed by Abdias Sheldon MD at [facility]  ERCP N/A 5/16/2019    ERCP BIOPSY performed by Abdias Sheldon MD at [facility]  ERCP N/A 5/16/2019    ERCP STENT INSERTION [facility] at [facility] ENDOSCOPY    ERCP N/A 5/16/2019    ERCP STONE REMOVAL VIA BALLOON SWEEP performed by Maria Ines Barajas MD at [facility]. No pertinent family history. Social History     Tobacco Use    Smoking status: Never Smoker    Smokeless tobacco: Never Used   Substance Use Topics    Alcohol use: No    Drug use: No       No Known Allergies  Current Outpatient Medications   Medication Sig Dispense Refill    metoprolol succinate (TOPROL XL) 25 MG extended release tablet Take 1 tablet by mouth daily 30 tablet 3    lisinopril (PRINIVIL;ZESTRIL) 10 MG tablet Take 10 mg by mouth daily       ondansetron (ZOFRAN ODT) 4 MG disintegrating tablet Take 1 tablet by mouth every 8 hours as needed for Nausea or Vomiting 10 tablet 0     No current facility-administered medications for this visit. Review of Systems:  · Constitutional: no unanticipated weight loss. There's been no change in energy level, sleep pattern, or activity level. No fevers, chills. · Eyes: No visual changes or diplopia. No scleral icterus. · ENT: No Headaches, hearing loss or vertigo. No mouth sores or sore throat. · Cardiovascular: as reviewed in HPI  · Respiratory: No cough or wheezing, no sputum production. No hematemesis. · Gastrointestinal: No abdominal pain, appetite loss, blood in stools. No change in bowel or bladder habits. · Genitourinary: No dysuria, trouble voiding, or hematuria. · Musculoskeletal:  No gait disturbance, no joint complaints. · Integumentary: No rash or pruritis. · Neurological: No headache, diplopia, change in muscle strength, numbness or tingling. · Psychiatric: No anxiety or depression. · Endocrine: No temperature intolerance. No excessive thirst, fluid intake, or urination. No tremor. · Hematologic/Lymphatic: No abnormal bruising or bleeding, blood clots or swollen lymph nodes. · Allergic/Immunologic: No nasal congestion or hives.     Physical Exam:   /80 (Site: Right Upper Arm, Position: Sitting, Cuff Size: Medium Adult)   Pulse 69   Ht 5' 1\" (1.549 m)   Wt 189 lb (85.7 kg) Comment: with shoes  SpO2 98%   BMI 35.71 kg/m²   Wt Readings from Last 3 Encounters:   06/11/19 189 lb (85.7 kg)   05/18/19 207 lb 7.3 oz (94.1 kg)   05/15/19 195 lb 12.3 oz (88.8 kg)     Constitutional: He is oriented to person, place, and time. He appears well-developed and well-nourished. In no acute distress. Head: Normocephalic and atraumatic. Pupils equal and round. Neck: Neck supple. No JVP or carotid bruit appreciated. No mass and no thyromegaly present. No lymphadenopathy present. Cardiovascular: Normal rate. Normal heart sounds. Exam reveals no gallop and no friction rub. I/VI ANGELI. Pulmonary/Chest: Effort normal and breath sounds normal. No respiratory distress. He has no wheezes, rhonchi or rales. Abdominal: Soft, non-tender. Bowel sounds are normal. He exhibits no organomegaly, mass or bruit. Extremities: No edema, cyanosis, or clubbing. Pulses are 2+ radial/dorsalis pedis/posterior tibial/carotid bilaterally. Neurological: No gross cranial nerve deficit. Coordination normal.   Skin: Skin is warm and dry. There is no rash or diaphoresis. Psychiatric: He has a normal mood and affect. His speech is normal and behavior is normal.     Lab Review:   FLP:  No results found for: TRIG, HDL, LDLCALC, LDLDIRECT, LABVLDL  BUN/Creatinine:    Lab Results   Component Value Date    BUN 13 05/18/2019    CREATININE 0.8 05/18/2019       EKG Interpretation: 6/11/19 Sinus rhythm. LVH with repolarization abnormality. Image Review:   Echo: 5/16/19   Overall left ventricular systolic function appears hyperdynamic. Ejection fraction is visually estimated to be >70%. There is severe concentric left ventricular hypertrophy. No regional wall motion abnormalities are noted. The right ventricle is normal in size and function. Possible systolic anterior motion (GOSIA) of anterior leaflet.  Mild mitral regurgitation. There is a late peaking LV outflow tract gradient which significantly increases with valsalva (peak velocity increases from 2 to 5.5 m/s). Findings are suggestive of HOCM.     Stress test: 3/2018  Pharmacologic vasodilator stress myocardial perfusion study without evidence of myocardial ischemia or infarct. Assessment/Plan:   1) Essential hypertension/Severe LVH. Goal BP <120/80. Assuming LVH is related to uncontrolled HTN but is on minimal antihypertensives for BP control. Continue ACE-I and B-blocker. Encouraged to continue to monitor blood pressure at home and call if it remains elevated.    2) Possible HOCM. Will arrange for an outpatient MRI to evaluate for infiltrative disease. Continue with BP and HR control.    3) Mitral regurgitation. Possibly from [facility]. Will arrange for MRI. 4) Obesity. BMI 35. Encouraged weight loss. Will discuss referral for sleep study at follow-up if not previously performed. Follow up in 3 months. Thank you very much for allowing me to participate in the care of your patient. Please do not hesitate to contact me if you have any questions. Sincerely,  Mirian Schwab. Julia Padilla, [address] [place] [address]  Ph: [phone]  Fax: [phone]    This note was scribed in the presence of Dr Julia Padilla MD by Marizol Schreiber RN. Physician Attestation: The scribes documentation has been prepared under my direction and personally reviewed by me in its entirety. I confirm that the note above accurately reflects all work, treatment, procedures, and medical decision making performed by me.

## 2019-06-11 NOTE — PATIENT INSTRUCTIONS
Patient Education        MRI of the Heart: About This Test  What is it? MRI (magnetic resonance imaging) is a test that uses a magnetic field and pulses of radio wave energy to make pictures of the organs and structures inside the body. An MRI of the heart looks at the structures and blood vessels of the heart. When you have an MRI, you lie on a table and the table moves into the MRI machine. A special dye (contrast material) may be put in a vein (IV) in your arm or hand to make the blood vessels easier to see on the scan. Why is this test done? An MRI of the heart is done to look at the structures and blood vessels of the heart. These may include:  · The heart muscle and the sac around the heart (pericardium). · The heart valves. · The coronary arteries. These are the blood vessels that bring blood to your heart muscle. · The blood vessels that carry blood from the lungs to the heart. · The aorta, which is a large blood vessel that carries blood from the heart to the rest of the body. How can you prepare for the test?  Talk to your doctor about all of your health conditions before the test. For example, tell your doctor if:  · You are allergic to any medicines. · You are or might be pregnant. · You have a pacemaker, an artificial limb, any metal pins or metal parts in your body, metal heart valves, metal clips in your brain, metal implants in your ears, or any other implanted or prosthetic medical device. · You have an intrauterine device (IUD) in place. · You get nervous in confined spaces. You may need medicine to help you relax. · You wear a patch that contains medicine. · You have kidney disease. What happens before the test?  · You will remove all metal objects. These include hearing aids, dentures, jewelry, watches, and hairpins. · You will take off all or most of your clothes and then change into a gown.  If you do leave some clothes on, make sure you take everything out of your pockets. What happens during the test?  · You may have contrast material (dye) put into your arm or hand through a tube called an IV. · You will lie on your back on a table that is part of the MRI scanner. · Small pads or patches (electrodes) will be attached to your skin on each arm and leg and on your chest. A special paste or pad may go between the electrode and your skin. The electrodes are hooked to a machine that traces your heart activity onto a paper. · The table will slide into the space that contains the magnet. · Inside the scanner you will hear a fan and feel air moving. You may hear tapping, thumping, or snapping noises. You may be given earplugs or headphones to reduce the noise. · You will be asked to hold still during the scan. You may be asked to hold your breath for short periods. · You may be alone in the scanning room. But the technologist will watch you through a window. You will be able to talk to him or her through an intercom. What else should you know about the test?  · An MRI does not hurt. · You may feel warmth in the area being examined. This is normal.  · A dye (contrast material) that contains gadolinium may be used in this test. Be sure to tell your doctor if:  ? You are pregnant or think you may be pregnant. ? You have kidney problems. ? You've had more than one test that used gadolinium. · The U.S. Food and Drug Administration (FDA) has safety warnings about gadolinium. But for most people, the benefit of its use in this test outweighs the risk. · If a dye is used, you may feel a quick sting or pinch and some coolness when the IV is started. The dye may give you a metallic taste in your mouth. Some people feel sick to their stomach or get a headache. · If you breastfeed and are concerned about whether the dye used in this test is safe, talk to your doctor. Most experts believe that very little dye passes into breast milk and even less is passed on to the baby.  But if you prefer, you can store some of your breast milk ahead of time and use it for a day or two after the test.  How long does the test take? · The test usually takes 30 to 60 minutes but can take as long as 2 hours. What happens after the test?  · You will probably be able to go home right away, depending on the reason for the test.  · You can go back to your usual activities right away. · If you had dye, drink plenty of fluids for 24 hours after the test to help flush it out of your body. Follow-up care is a key part of your treatment and safety. Be sure to make and go to all appointments, and call your doctor if you are having problems. It's also a good idea to keep a list of the medicines you take. Ask your doctor when you can expect to have your test results. Where can you learn more? Go to https://Squid Facilpepiceweb.Cytoguide. org and sign in to your If You Can account. Enter L409 in the watAgame box to learn more about \"MRI of the Heart: About This Test.\"     If you do not have an account, please click on the \"Sign Up Now\" link. Current as of: June 25, 2018  Content Version: 12.0  © 1792-5915 Healthwise, Incorporated. Care instructions adapted under license by [facility] ([facility]). If you have questions about a medical condition or this instruction, always ask your healthcare professional. [place] [address] any warranty or liability for your use of this information.

## 2019-06-12 DIAGNOSIS — I51.7 LVH (LEFT VENTRICULAR HYPERTROPHY): Primary | ICD-10-CM

## 2019-07-01 ENCOUNTER — ANESTHESIA EVENT (OUTPATIENT)
Dept: ENDOSCOPY | Age: 44
End: 2019-07-01
Payer: COMMERCIAL

## 2019-07-02 ENCOUNTER — ANESTHESIA (OUTPATIENT)
Dept: ENDOSCOPY | Age: 44
End: 2019-07-02
Payer: COMMERCIAL

## 2019-07-02 ENCOUNTER — APPOINTMENT (OUTPATIENT)
Dept: GENERAL RADIOLOGY | Age: 44
End: 2019-07-02
Attending: INTERNAL MEDICINE
Payer: COMMERCIAL

## 2019-07-02 ENCOUNTER — HOSPITAL ENCOUNTER (OUTPATIENT)
Age: 44
Setting detail: OUTPATIENT SURGERY
Discharge: HOME | End: 2019-07-02
Attending: INTERNAL MEDICINE | Admitting: INTERNAL MEDICINE
Payer: COMMERCIAL

## 2019-07-02 VITALS
HEIGHT: 61 IN | DIASTOLIC BLOOD PRESSURE: 55 MMHG | RESPIRATION RATE: 16 BRPM | HEART RATE: 64 BPM | WEIGHT: 187.5 LBS | TEMPERATURE: 97 F | BODY MASS INDEX: 35.4 KG/M2 | OXYGEN SATURATION: 96 % | SYSTOLIC BLOOD PRESSURE: 100 MMHG

## 2019-07-02 VITALS
SYSTOLIC BLOOD PRESSURE: 89 MMHG | RESPIRATION RATE: 15 BRPM | DIASTOLIC BLOOD PRESSURE: 50 MMHG | OXYGEN SATURATION: 94 %

## 2019-07-02 PROCEDURE — 7100000011 HC PHASE II RECOVERY - ADDTL 15 MIN: Performed by: INTERNAL MEDICINE

## 2019-07-02 PROCEDURE — 7100000001 HC PACU RECOVERY - ADDTL 15 MIN: Performed by: INTERNAL MEDICINE

## 2019-07-02 PROCEDURE — 6360000002 HC RX W HCPCS: Performed by: NURSE ANESTHETIST, CERTIFIED REGISTERED

## 2019-07-02 PROCEDURE — 2709999900 HC NON-CHARGEABLE SUPPLY: Performed by: INTERNAL MEDICINE

## 2019-07-02 PROCEDURE — 6360000004 HC RX CONTRAST MEDICATION: Performed by: INTERNAL MEDICINE

## 2019-07-02 PROCEDURE — 2580000003 HC RX 258: Performed by: ANESTHESIOLOGY

## 2019-07-02 PROCEDURE — C1769 GUIDE WIRE: HCPCS | Performed by: INTERNAL MEDICINE

## 2019-07-02 PROCEDURE — 2720000010 HC SURG SUPPLY STERILE: Performed by: INTERNAL MEDICINE

## 2019-07-02 PROCEDURE — 3700000000 HC ANESTHESIA ATTENDED CARE: Performed by: INTERNAL MEDICINE

## 2019-07-02 PROCEDURE — 3609015000 HC ERCP REMOVE FOREIGN BODY/STENT BILIARY/PANC DUCT: Performed by: INTERNAL MEDICINE

## 2019-07-02 PROCEDURE — 2500000003 HC RX 250 WO HCPCS: Performed by: NURSE ANESTHETIST, CERTIFIED REGISTERED

## 2019-07-02 PROCEDURE — 3609015200 HC ERCP REMOVE CALCULI/DEBRIS BILIARY/PANCREAS DUCT: Performed by: INTERNAL MEDICINE

## 2019-07-02 PROCEDURE — 74328 X-RAY BILE DUCT ENDOSCOPY: CPT

## 2019-07-02 PROCEDURE — 3700000001 HC ADD 15 MINUTES (ANESTHESIA): Performed by: INTERNAL MEDICINE

## 2019-07-02 PROCEDURE — 7100000010 HC PHASE II RECOVERY - FIRST 15 MIN: Performed by: INTERNAL MEDICINE

## 2019-07-02 PROCEDURE — 7100000000 HC PACU RECOVERY - FIRST 15 MIN: Performed by: INTERNAL MEDICINE

## 2019-07-02 RX ORDER — ONDANSETRON 2 MG/ML
INJECTION INTRAMUSCULAR; INTRAVENOUS PRN
Status: DISCONTINUED | OUTPATIENT
Start: 2019-07-02 | End: 2019-07-02 | Stop reason: SDUPTHER

## 2019-07-02 RX ORDER — MIDAZOLAM HYDROCHLORIDE 1 MG/ML
INJECTION INTRAMUSCULAR; INTRAVENOUS PRN
Status: DISCONTINUED | OUTPATIENT
Start: 2019-07-02 | End: 2019-07-02 | Stop reason: SDUPTHER

## 2019-07-02 RX ORDER — LIDOCAINE HYDROCHLORIDE 20 MG/ML
INJECTION, SOLUTION EPIDURAL; INFILTRATION; INTRACAUDAL; PERINEURAL PRN
Status: DISCONTINUED | OUTPATIENT
Start: 2019-07-02 | End: 2019-07-02 | Stop reason: SDUPTHER

## 2019-07-02 RX ORDER — SUCCINYLCHOLINE/SOD CL,ISO/PF 200MG/10ML
SYRINGE (ML) INTRAVENOUS PRN
Status: DISCONTINUED | OUTPATIENT
Start: 2019-07-02 | End: 2019-07-02 | Stop reason: SDUPTHER

## 2019-07-02 RX ORDER — SODIUM CHLORIDE 9 MG/ML
INJECTION, SOLUTION INTRAVENOUS CONTINUOUS
Status: DISCONTINUED | OUTPATIENT
Start: 2019-07-02 | End: 2019-07-02 | Stop reason: HOSPADM

## 2019-07-02 RX ORDER — PROPOFOL 10 MG/ML
INJECTION, EMULSION INTRAVENOUS PRN
Status: DISCONTINUED | OUTPATIENT
Start: 2019-07-02 | End: 2019-07-02 | Stop reason: SDUPTHER

## 2019-07-02 RX ORDER — SODIUM CHLORIDE 0.9 % (FLUSH) 0.9 %
10 SYRINGE (ML) INJECTION EVERY 12 HOURS SCHEDULED
Status: DISCONTINUED | OUTPATIENT
Start: 2019-07-02 | End: 2019-07-02 | Stop reason: HOSPADM

## 2019-07-02 RX ORDER — FENTANYL CITRATE 50 UG/ML
INJECTION, SOLUTION INTRAMUSCULAR; INTRAVENOUS PRN
Status: DISCONTINUED | OUTPATIENT
Start: 2019-07-02 | End: 2019-07-02 | Stop reason: SDUPTHER

## 2019-07-02 RX ORDER — ONDANSETRON 2 MG/ML
4 INJECTION INTRAMUSCULAR; INTRAVENOUS
Status: DISCONTINUED | OUTPATIENT
Start: 2019-07-02 | End: 2019-07-02 | Stop reason: HOSPADM

## 2019-07-02 RX ORDER — DEXAMETHASONE SODIUM PHOSPHATE 4 MG/ML
INJECTION, SOLUTION INTRA-ARTICULAR; INTRALESIONAL; INTRAMUSCULAR; INTRAVENOUS; SOFT TISSUE PRN
Status: DISCONTINUED | OUTPATIENT
Start: 2019-07-02 | End: 2019-07-02 | Stop reason: SDUPTHER

## 2019-07-02 RX ORDER — SODIUM CHLORIDE 0.9 % (FLUSH) 0.9 %
10 SYRINGE (ML) INJECTION PRN
Status: DISCONTINUED | OUTPATIENT
Start: 2019-07-02 | End: 2019-07-02 | Stop reason: HOSPADM

## 2019-07-02 RX ORDER — ROCURONIUM BROMIDE 10 MG/ML
INJECTION, SOLUTION INTRAVENOUS PRN
Status: DISCONTINUED | OUTPATIENT
Start: 2019-07-02 | End: 2019-07-02 | Stop reason: SDUPTHER

## 2019-07-02 RX ADMIN — FENTANYL CITRATE 50 MCG: 50 INJECTION INTRAMUSCULAR; INTRAVENOUS at 15:14

## 2019-07-02 RX ADMIN — Medication 100 MG: at 14:51

## 2019-07-02 RX ADMIN — LIDOCAINE HYDROCHLORIDE 50 MG: 20 INJECTION, SOLUTION EPIDURAL; INFILTRATION; INTRACAUDAL; PERINEURAL at 14:50

## 2019-07-02 RX ADMIN — FENTANYL CITRATE 50 MCG: 50 INJECTION INTRAMUSCULAR; INTRAVENOUS at 14:50

## 2019-07-02 RX ADMIN — ONDANSETRON 4 MG: 2 INJECTION INTRAMUSCULAR; INTRAVENOUS at 15:04

## 2019-07-02 RX ADMIN — MIDAZOLAM 2 MG: 1 INJECTION INTRAMUSCULAR; INTRAVENOUS at 14:50

## 2019-07-02 RX ADMIN — ROCURONIUM BROMIDE 5 MG: 10 INJECTION INTRAVENOUS at 14:52

## 2019-07-02 RX ADMIN — PROPOFOL 200 MG: 10 INJECTION, EMULSION INTRAVENOUS at 14:51

## 2019-07-02 RX ADMIN — SODIUM CHLORIDE: 9 INJECTION, SOLUTION INTRAVENOUS at 13:29

## 2019-07-02 RX ADMIN — DEXAMETHASONE SODIUM PHOSPHATE 4 MG: 4 INJECTION, SOLUTION INTRAMUSCULAR; INTRAVENOUS at 15:04

## 2019-07-02 RX ADMIN — SODIUM CHLORIDE: 9 INJECTION, SOLUTION INTRAVENOUS at 14:32

## 2019-07-02 ASSESSMENT — PULMONARY FUNCTION TESTS
PIF_VALUE: 17
PIF_VALUE: 3
PIF_VALUE: 23
PIF_VALUE: 21
PIF_VALUE: 7
PIF_VALUE: 22
PIF_VALUE: 2
PIF_VALUE: 3
PIF_VALUE: 23
PIF_VALUE: 1
PIF_VALUE: 15
PIF_VALUE: 2
PIF_VALUE: 24
PIF_VALUE: 17
PIF_VALUE: 23
PIF_VALUE: 25
PIF_VALUE: 19
PIF_VALUE: 0
PIF_VALUE: 0
PIF_VALUE: 3
PIF_VALUE: 0
PIF_VALUE: 20
PIF_VALUE: 1
PIF_VALUE: 19
PIF_VALUE: 0
PIF_VALUE: 15
PIF_VALUE: 19
PIF_VALUE: 23
PIF_VALUE: 20
PIF_VALUE: 23
PIF_VALUE: 25

## 2019-07-02 ASSESSMENT — PAIN SCALES - GENERAL
PAINLEVEL_OUTOF10: 0

## 2019-07-02 ASSESSMENT — LIFESTYLE VARIABLES: SMOKING_STATUS: 0

## 2019-07-02 ASSESSMENT — PAIN - FUNCTIONAL ASSESSMENT: PAIN_FUNCTIONAL_ASSESSMENT: 0-10

## 2019-07-02 NOTE — H&P
[address] [place] [address]   Pre-operative History and Physical    Patient: Carmine Gowers  : 1975  Acct#:     HISTORY OF PRESENT ILLNESS:    The patient is a 37 y.o. 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 Planning ERCP. Past Medical History:        Diagnosis Date    Hyperlipidemia     Hypertension     LVH (left ventricular hypertrophy)     Sleep apnea     USES C-PAP    Wears contact lenses       Past Surgical History:        Procedure Laterality Date    CHOLECYSTECTOMY, LAPAROSCOPIC N/A 2019    LAPAROSCOPIC CHOLECYSTECTOMY performed by Janann Simmonds, MD at [facility] ERCP N/A 2019    ERCP SPHINCTER/PAPILLOTOMY performed by Lucy Cerrato MD at [facility]  ERCP N/A 2019    ERCP BIOPSY performed by Lucy Cerrato MD at [facility]  ERCP N/A 2019    ERCP STENT INSERTION performed by Lucy Cerrato MD at [facility]  ERCP N/A 2019    ERCP STONE REMOVAL VIA BALLOON SWEEP performed by Lucy Cerrato MD at [address] [place] [address]      Medications Prior to Admission:   No current facility-administered medications on file prior to encounter. Current Outpatient Medications on File Prior to Encounter   Medication Sig Dispense Refill    Multiple Vitamins-Minerals (MULTIVITAMIN ADULT PO) Take 1 tablet by mouth daily      metoprolol succinate (TOPROL XL) 25 MG extended release tablet Take 1 tablet by mouth daily 30 tablet 3    lisinopril (PRINIVIL;ZESTRIL) 10 MG tablet Take 10 mg by mouth daily       ondansetron (ZOFRAN ODT) 4 MG disintegrating tablet Take 1 tablet by mouth every 8 hours as needed for Nausea or Vomiting 10 tablet 0        Allergies:  Patient has no known allergies.     Social History:   Social History     Socioeconomic History    Marital status:      Spouse name: Not on file    Number of children: Not on file    Years of education: Not on file    Highest education

## 2019-07-02 NOTE — OP NOTE
duct but no stones or sludge. Pancreatogram:  The pancreatic duct was not cannulated. The cannulas were then withdrawn. The duodenum and stomach were decompressed and the scope was withdrawn from the patient. The patient tolerated the procedure well and was taken to the post anesthesia care unit in good condition. Radiological Interpretation:  All fluoroscopic images and still x-ray films were carefullly examined and interpreted by the endoscopist on the spot during the procedure in the absence of radiologist.  These interpretations guided the course of the procedure and the interventions mentioned above and below. Estimated blood loss: none  Specimens taken: none      Impression:  1. Resolution of the biliary stricture. Stent removed and balloon sweeps negative. Recommendations:  1. Clear diet. If does well overnight can advance diet as tolerated tomorrow. 2.  Follow up as needed.       Stephen Casanova  7/2/2019

## 2019-07-05 LAB
LV EF: 72 %
LVEF MODALITY: NORMAL

## 2019-09-12 ENCOUNTER — PATIENT MESSAGE (OUTPATIENT)
Dept: CARDIOLOGY CLINIC | Age: 44
End: 2019-09-12

## 2020-05-11 ENCOUNTER — OFFICE VISIT (OUTPATIENT)
Dept: CARDIOLOGY CLINIC | Age: 45
End: 2020-05-11

## 2020-05-11 VITALS — WEIGHT: 179 LBS | BODY MASS INDEX: 33.79 KG/M2 | HEIGHT: 61 IN

## 2020-05-11 PROCEDURE — 99214 OFFICE O/P EST MOD 30 MIN: CPT | Performed by: INTERNAL MEDICINE

## 2020-05-11 RX ORDER — LISINOPRIL 20 MG/1
20 TABLET ORAL DAILY
Qty: 90 TABLET | Refills: 3 | Status: SHIPPED | OUTPATIENT
Start: 2020-05-11 | End: 2020-10-29 | Stop reason: SDUPTHER

## 2020-05-11 NOTE — PROGRESS NOTES
change in bowel or bladder habits. · Genitourinary: No dysuria, trouble voiding, or hematuria. · Musculoskeletal:  No gait disturbance, no joint complaints. · Integumentary: No rash or pruritis. · Neurological: No headache, diplopia, change in muscle strength, numbness or tingling. · Psychiatric: No anxiety or depression. · Endocrine: No temperature intolerance. No excessive thirst, fluid intake, or urination. No tremor. · Hematologic/Lymphatic: No abnormal bruising or bleeding, blood clots or swollen lymph nodes. · Allergic/Immunologic: No nasal congestion or hives. Prior to Visit Medications    Medication Sig Taking?  Authorizing Provider   metoprolol succinate (TOPROL XL) 25 MG extended release tablet Take 1 tablet by mouth daily Yes Anderson Neither, MD   Multiple Vitamins-Minerals (MULTIVITAMIN ADULT PO) Take 1 tablet by mouth daily Yes Historical Provider, MD   lisinopril (PRINIVIL;ZESTRIL) 10 MG tablet Take 10 mg by mouth daily  Yes Historical Provider, MD   ondansetron (ZOFRAN ODT) 4 MG disintegrating tablet Take 1 tablet by mouth every 8 hours as needed for Nausea or Vomiting Yes Barry Ospina MD       Social History     Tobacco Use    Smoking status: Never Smoker    Smokeless tobacco: Never Used   Substance Use Topics    Alcohol use: No     Alcohol/week: 0.0 standard drinks    Drug use: No        No Known Allergies    PHYSICAL EXAMINATION:  [ INSTRUCTIONS:  \"[x]\" Indicates a positive item  \"[]\" Indicates a negative item  -- DELETE ALL ITEMS NOT EXAMINED]  Vital Signs: (As obtained by patient/caregiver or practitioner observation)    Blood pressure-  Heart rate-    Respiratory rate- 14  Temperature-  Pulse oximetry-     Constitutional: [x] Appears well-developed and well-nourished [x] No apparent distress      [] Abnormal-   Mental status  [x] Alert and awake  [x] Oriented to person/place/time [x]Able to follow commands      Eyes:  EOM    [x]  Normal  [] Abnormal-  Sclera  [x]  Normal  [] Abnormal -         Discharge [x]  None visible  [] Abnormal -    HENT:   [x] Normocephalic, atraumatic. [] Abnormal   [x] Mouth/Throat: Mucous membranes are moist.     External Ears [x] Normal  [] Abnormal-     Neck: [x] No visualized mass     Pulmonary/Chest: [x] Respiratory effort normal.  [x] No visualized signs of difficulty breathing or respiratory distress        [] Abnormal-      Musculoskeletal:   [x] Normal range of motion of neck        [] Abnormal-       Neurological:        [x] No Facial Asymmetry (Cranial nerve 7 motor function) (limited exam to video visit)          [x] No gaze palsy        [] Abnormal-         Skin:        [x] No significant exanthematous lesions or discoloration noted on facial skin         [] Abnormal-            Psychiatric:       [x] Normal Affect [x] No Hallucinations        [] Abnormal-     Other pertinent observable physical exam findings- N/A.    ASSESSMENT/PLAN:    1) HCM. Continue with BP and HR control. Referral placed for [facility] genetics clinic. 2)  Essential hypertension/Severe LVH. Goal BP <120/80. Continue ACE-I and B-blocker. Increase lisinopril to 20 mg daily. BMP in 2 weeks. Encouraged to continue to monitor blood pressure at home and call if it remains elevated.    3) Mitral regurgitation. Moderate per Cardiac MRI. Will repeat echocardiogram at follow-up in 6 months. Follow up in office in 6 months     Anusha Campbell is a 40 y.o. male being evaluated by a Virtual Visit (video visit) encounter to address concerns as mentioned above. A caregiver was present when appropriate. Due to this being a TeleHealth encounter (During [facility] public health emergency), evaluation of the following organ systems was limited: Vitals/Constitutional/EENT/Resp/CV/GI//MS/Neuro/Skin/Heme-Lymph-Imm.   Pursuant to the emergency declaration under the [address] [place] waiver authority and the Roge Ruthy [facility]

## 2020-08-25 ENCOUNTER — HOSPITAL ENCOUNTER (EMERGENCY)
Age: 45
Discharge: HOME | End: 2020-08-25
Payer: COMMERCIAL

## 2020-08-25 ENCOUNTER — APPOINTMENT (OUTPATIENT)
Dept: GENERAL RADIOLOGY | Age: 45
End: 2020-08-25
Payer: COMMERCIAL

## 2020-08-25 VITALS
HEART RATE: 68 BPM | BODY MASS INDEX: 32.74 KG/M2 | DIASTOLIC BLOOD PRESSURE: 67 MMHG | SYSTOLIC BLOOD PRESSURE: 121 MMHG | HEIGHT: 62 IN | OXYGEN SATURATION: 100 % | TEMPERATURE: 98 F

## 2020-08-25 PROCEDURE — 73610 X-RAY EXAM OF ANKLE: CPT

## 2020-08-25 PROCEDURE — 99283 EMERGENCY DEPT VISIT LOW MDM: CPT

## 2020-08-25 RX ORDER — IBUPROFEN 600 MG/1
600 TABLET ORAL EVERY 8 HOURS PRN
Qty: 20 TABLET | Refills: 0 | Status: SHIPPED | OUTPATIENT
Start: 2020-08-25 | End: 2021-05-18

## 2020-08-25 ASSESSMENT — PAIN DESCRIPTION - FREQUENCY
FREQUENCY: CONTINUOUS
FREQUENCY: CONTINUOUS

## 2020-08-25 ASSESSMENT — PAIN DESCRIPTION - ONSET
ONSET: ON-GOING
ONSET: ON-GOING

## 2020-08-25 ASSESSMENT — PAIN DESCRIPTION - LOCATION
LOCATION: ANKLE
LOCATION: ANKLE

## 2020-08-25 ASSESSMENT — PAIN DESCRIPTION - PROGRESSION
CLINICAL_PROGRESSION: NOT CHANGED
CLINICAL_PROGRESSION: NOT CHANGED

## 2020-08-25 ASSESSMENT — PAIN DESCRIPTION - ORIENTATION
ORIENTATION: LEFT
ORIENTATION: LEFT

## 2020-08-25 ASSESSMENT — PAIN DESCRIPTION - PAIN TYPE
TYPE: ACUTE PAIN
TYPE: ACUTE PAIN

## 2020-08-25 ASSESSMENT — PAIN SCALES - GENERAL
PAINLEVEL_OUTOF10: 8
PAINLEVEL_OUTOF10: 7

## 2020-08-25 ASSESSMENT — PAIN - FUNCTIONAL ASSESSMENT
PAIN_FUNCTIONAL_ASSESSMENT: PREVENTS OR INTERFERES SOME ACTIVE ACTIVITIES AND ADLS
PAIN_FUNCTIONAL_ASSESSMENT: PREVENTS OR INTERFERES SOME ACTIVE ACTIVITIES AND ADLS

## 2020-08-25 ASSESSMENT — PAIN DESCRIPTION - DESCRIPTORS
DESCRIPTORS: ACHING
DESCRIPTORS: ACHING

## 2020-08-25 NOTE — ED TRIAGE NOTES
Pt states he was at work and made a motion to show a move and felt a tweak in his right ankle. Hard to bear weight now.

## 2020-08-28 NOTE — ED PROVIDER NOTES
**ADVANCED PRACTICE PROVIDER, I HAVE EVALUATED THIS PATIENT**        [address] [facility] ENCOUNTER      Pt Name: Lucía Borrego  XMV:[id number]  [place] 1975  Date of evaluation: 8/25/2020  Provider: Lucia Schwab PA-C      Chief Complaint:    Chief Complaint   Patient presents with    Ankle Pain     left ankle pain. pt states he may have twisted his ankle this am.  pt states pain increased 1500 today       Nursing Notes, Past Medical Hx, Past Surgical Hx, Social Hx, Allergies, and Family Hx were all reviewed and agreed with or any disagreements were addressed in the HPI.    HPI:  (Location, Duration, Timing, Severity, Quality, Assoc Sx, Context, Modifying factors)  This is a  40 y.o. male who presents here to the emergency department, states that thinks that he might of twisted his ankle this morning. He was walking, rolled his ankle and noticed that he is now having some swelling to his ankle, pain level 8/10, and it is difficult to bear weight now.   Pain is worse with range of motion and movement better with rest.    PastMedical/Surgical History:      Diagnosis Date    Hyperlipidemia     Hypertension     LVH (left ventricular hypertrophy)     Sleep apnea     USES C-PAP    Wears contact lenses          Procedure Laterality Date    CHOLECYSTECTOMY, LAPAROSCOPIC N/A 5/17/2019    LAPAROSCOPIC CHOLECYSTECTOMY performed by Anupama Paz MD at [facility] ERCP N/A 5/16/2019    ERCP SPHINCTER/PAPILLOTOMY performed by Mariaelena Walker MD at [facility]  ERCP N/A 5/16/2019    ERCP BIOPSY performed by Mariaelena Walker MD at [facility] ERCP N/A 5/16/2019    ERCP STENT INSERTION performed by Mariaelena Walker MD at [address] [facility] [address] ERCP N/A 5/16/2019    ERCP STONE REMOVAL VIA BALLOON SWEEP performed by Mariaelena Walker MD at [address] [facility] [address] LUO N/A 7/2/2019    ENDOSCOPIC RETROGRADE CHOLANGIOPANCREATOGRAPHY, WITH STENT REMOVAL performed by David Chavez MD at 1 [facility]  ERCP N/A 7/2/2019    ERCP BALLOON SWEEP performed by David Chavez MD at [facility] [facility]       Medications:  Discharge Medication List as of 8/25/2020  7:35 PM      CONTINUE these medications which have NOT CHANGED    Details   lisinopril (PRINIVIL;ZESTRIL) 20 MG tablet Take 1 tablet by mouth daily, Disp-90 tablet, R-3Normal      metoprolol succinate (TOPROL XL) 25 MG extended release tablet Take 1 tablet by mouth daily, Disp-30 tablet, R-1Normal      Multiple Vitamins-Minerals (MULTIVITAMIN ADULT PO) Take 1 tablet by mouth dailyHistorical Med      ondansetron (ZOFRAN ODT) 4 MG disintegrating tablet Take 1 tablet by mouth every 8 hours as needed for Nausea or Vomiting, Disp-10 tablet, R-0Print               Review of Systems:  Review of Systems  Positives and Pertinent negatives as per HPI. Except as noted above in the ROS, problem specific ROS was completed and is negative. Physical Exam:  Physical Exam  Vitals signs and nursing note reviewed. Constitutional:       Appearance: He is well-developed. He is not diaphoretic. HENT:      Head: Normocephalic and atraumatic. Right Ear: External ear normal.      Left Ear: External ear normal.      Nose: Nose normal.   Eyes:      General:         Right eye: No discharge. Left eye: No discharge. Neck:      Musculoskeletal: Normal range of motion and neck supple. Pulmonary:      Effort: Pulmonary effort is normal. No respiratory distress. Breath sounds: No stridor. Musculoskeletal:      Right ankle: He exhibits decreased range of motion. He exhibits no swelling and normal pulse. Tenderness. AITFL and CF ligament tenderness found. Achilles tendon exhibits no pain, no defect and normal Stoner's test results. Skin:     General: Skin is warm and dry. Coloration: Skin is not pale. Neurological:      Mental Status: He is alert and oriented to person, place, and time. afterwards, as examined by myself. Patient was given:  Medications - No data to display    The differential diagnosis in this patient includes fracture, occult fracture, sprain and strain. There is no evidence of fracture today, x-rays were normal, and I suspect an ankle sprain. The patient is neurovascularly patent and will be referred appropriately. The patient tolerated their visit well. I evaluated the patient. The physician was available for consultation as needed. The patient and / or the family were informed of the results of any tests, a time was given to answer questions, a plan was proposed and they agreed with plan. CLINICAL IMPRESSION:  1.  Sprain of anterior talofibular ligament of left ankle, initial encounter        DISPOSITION Decision To Discharge 08/25/2020 07:30:08 PM      PATIENT REFERRED TO:  Niall Casillas [address], [address]  [phone]    Call today  For a recheck in 2-7 days, to be seen by this MD or one of their partners      DISCHARGE MEDICATIONS:  Discharge Medication List as of 8/25/2020  7:35 PM      START taking these medications    Details   ibuprofen (IBU) 600 MG tablet Take 1 tablet by mouth every 8 hours as needed for Pain, Disp-20 tablet,R-0Print             DISCONTINUED MEDICATIONS:  Discharge Medication List as of 8/25/2020  7:35 PM                 (Please note the MDM and HPI sections of this note were completed with a voice recognition program.  Efforts were made to edit the dictations but occasionally words are mis-transcribed.)    Electronically signed, Cristino Alfonso PA-C,        Cristino Alfonso PA-C  08/28/20 [id number]

## 2020-10-04 ENCOUNTER — PATIENT MESSAGE (OUTPATIENT)
Dept: CARDIOLOGY CLINIC | Age: 45
End: 2020-10-04

## 2020-10-29 RX ORDER — METOPROLOL SUCCINATE 25 MG/1
25 TABLET, EXTENDED RELEASE ORAL DAILY
Qty: 30 TABLET | Refills: 3 | Status: SHIPPED | OUTPATIENT
Start: 2020-10-29 | End: 2020-11-13

## 2020-10-29 RX ORDER — LISINOPRIL 20 MG/1
20 TABLET ORAL DAILY
Qty: 90 TABLET | Refills: 3 | Status: SHIPPED | OUTPATIENT
Start: 2020-10-29 | End: 2021-01-25 | Stop reason: SDUPTHER

## 2020-11-11 NOTE — PATIENT INSTRUCTIONS

## 2020-11-11 NOTE — PROGRESS NOTES
[facility]      Cardiology Consult    Courtier Aundrea Jean-Baptiste  1975 November 13, 2020      Reason for Referral: LVH, HOCM     CC: \"shoulder pain\"     HPI:  The patient is 40 y.o. male with a past medical history significant for HTN/LVH, obesity, and HOCM who presents today for management of LVH and HOCM. He presented to the hospital with complaints of acute RUQ abdominal pain with associated nausea and vomiting. He also reported a fever and was evaluated by GI and general surgery for choledocholithiasis. Cardiology was originally consulted for a pre-operative risk assessment for an abnormal EKG. He underwent an ERCP 5/16/19 and cholecystectomy 5/17/19. He had known LVH from previous testing at [facility] but he denied routine follow-up with cardiology. He believed his BP had been controlled on medications but had many years without routine health care. He completed an echocardiogram that showed severe LVH and HOCM. His cardiac MRI was consistent with hypertrophic cardiomyopathy with LV function normal. Today, he states he is doing well and denies any new cardiac sounding complaints. He denies any worsening shortness of breath, LE edema, or chest pains. He states he tries to remain active at the gym and denies any exertional symptoms or syncope. He completed a telemedicine visit with [facility] human genetics but has not completed the testing that was mailed to his house. He states he has 3 children and they have not undergone any genetic or cardiac testing. He reports compliance with his medications and CPAP. He states he would like to follow up with sleep medicine to re-establish care. He also needs to establish care with a PCP because of shoulder pain. Patient denies exertional chest pain/pressure, dyspnea at rest, worsening RAMIREZ, PND, orthopnea, palpitations, lightheadedness, weight changes, changes in LE edema, and syncope.      Past Medical History:   Diagnosis Date    Hyperlipidemia     Hypertension  LVH (left ventricular hypertrophy)     Sleep apnea     USES C-PAP    Wears contact lenses      Past Surgical History:   Procedure Laterality Date    CHOLECYSTECTOMY, LAPAROSCOPIC N/A 5/17/2019    LAPAROSCOPIC CHOLECYSTECTOMY performed by Karel Rivers MD at [facility] ERCP N/A 5/16/2019    ERCP SPHINCTER/PAPILLOTOMY performed by Thanh Benitez MD at [address] [facility] [address] ERCP N/A 5/16/2019    ERCP BIOPSY performed by Thanh Benitez MD at [facility] ERCP N/A 5/16/2019    ERCP STENT INSERTION performed by Thanh Benitez MD at [address] [facility] [address] ERCP N/A 5/16/2019    ERCP STONE REMOVAL VIA BALLOON SWEEP performed by Thanh Benitez MD at [facility] ERCP N/A 7/2/2019    ENDOSCOPIC RETROGRADE CHOLANGIOPANCREATOGRAPHY, WITH STENT REMOVAL performed by Thanh Benitez MD at 1 [facility]  ERCP N/A 7/2/2019    ERCP BALLOON SWEEP performed by Thanh Benitez MD at [address] History   Problem Relation Age of Onset    Lung Cancer Mother     High Blood Pressure Father      Social History     Tobacco Use    Smoking status: Never Smoker    Smokeless tobacco: Never Used   Substance Use Topics    Alcohol use: No     Alcohol/week: 0.0 standard drinks    Drug use: No       No Known Allergies  Current Outpatient Medications   Medication Sig Dispense Refill    NAPROXEN DR PO Take by mouth      lisinopril (PRINIVIL;ZESTRIL) 20 MG tablet Take 1 tablet by mouth daily 90 tablet 3    metoprolol succinate (TOPROL XL) 25 MG extended release tablet Take 1 tablet by mouth daily 30 tablet 3    Multiple Vitamins-Minerals (MULTIVITAMIN ADULT PO) Take 1 tablet by mouth daily      ibuprofen (IBU) 600 MG tablet Take 1 tablet by mouth every 8 hours as needed for Pain 20 tablet 0     No current facility-administered medications for this visit. Review of Systems:  · Constitutional: no unanticipated weight loss.  There's been no change in energy level, sleep pattern, or activity level. No fevers, chills. · Eyes: No visual changes or diplopia. No scleral icterus. · ENT: No Headaches, hearing loss or vertigo. No mouth sores or sore throat. · Cardiovascular: as reviewed in HPI  · Respiratory: No cough or wheezing, no sputum production. No hematemesis. · Gastrointestinal: No abdominal pain, appetite loss, blood in stools. No change in bowel or bladder habits. · Genitourinary: No dysuria, trouble voiding, or hematuria. · Musculoskeletal:  No gait disturbance, no joint complaints. · Integumentary: No rash or pruritis. · Neurological: No headache, diplopia, change in muscle strength, numbness or tingling. · Psychiatric: No anxiety or depression. · Endocrine: No temperature intolerance. No excessive thirst, fluid intake, or urination. No tremor. · Hematologic/Lymphatic: No abnormal bruising or bleeding, blood clots or swollen lymph nodes. · Allergic/Immunologic: No nasal congestion or hives. Physical Exam:   /76 (Site: Left Upper Arm, Position: Sitting, Cuff Size: Medium Adult)   Pulse 65   Temp 98.1 °F (36.7 °C)   Ht 5' 1\" (1.549 m)   Wt 182 lb 6.4 oz (82.7 kg) Comment: with shoes  SpO2 100%   BMI 34.46 kg/m²   Wt Readings from Last 3 Encounters:   11/13/20 182 lb 6.4 oz (82.7 kg)   05/11/20 179 lb (81.2 kg)   07/02/19 187 lb 8 oz (85.1 kg)     Constitutional: He is oriented to person, place, and time. He appears well-developed and well-nourished. In no acute distress. Head: Normocephalic and atraumatic. Pupils equal and round. Neck: Neck supple. No JVP or carotid bruit appreciated. No mass and no thyromegaly present. No lymphadenopathy present. Cardiovascular: Normal rate. Normal heart sounds. Exam reveals no gallop and no friction rub. I/VI ANGELI. Pulmonary/Chest: Effort normal and breath sounds normal. No respiratory distress. He has no wheezes, rhonchi or rales. Abdominal: Soft, non-tender.  Bowel sounds are normal. He exhibits no organomegaly, mass or bruit. Extremities: No edema, cyanosis, or clubbing. Pulses are 2+ radial/dorsalis pedis/posterior tibial/carotid bilaterally. Neurological: No gross cranial nerve deficit. Coordination normal.   Skin: Skin is warm and dry. There is no rash or diaphoresis. Psychiatric: He has a normal mood and affect. His speech is normal and behavior is normal.     Lab Review:   FLP:  No results found for: TRIG, HDL, LDLCALC, LDLDIRECT, LABVLDL  BUN/Creatinine:    Lab Results   Component Value Date    BUN 13 05/18/2019    CREATININE 0.8 05/18/2019       EKG Interpretation: 6/11/19 Sinus rhythm. LVH with repolarization abnormality. 11/13/20 Sinus rhythm. LVH with repolarization abnormalities. Image Review:     Stress test: 3/2018  Pharmacologic vasodilator stress myocardial perfusion study without evidence of myocardial ischemia or infarct. Echo: 5/16/19   Overall left ventricular systolic function appears hyperdynamic. Ejection fraction is visually estimated to be >70%. There is severe concentric left ventricular hypertrophy. No regional wall motion abnormalities are noted. The right ventricle is normal in size and function. Possible systolic anterior motion (GOSIA) of anterior leaflet. Mild mitral regurgitation. There is a late peaking LV outflow tract gradient which significantly increases with valsalva (peak velocity increases from 2 to 5.5 m/s).  Findings are suggestive of HOCM.     Cardiac MRI 7/5/19  Overall, this cardiac MRI shows a non-dilated left ventricle with preserved left ventricular    ejection fraction, severe concentric left ventricular hypertrophy with a maximal thickness of    20 mm in the mid septum, a non-dilated right ventricle with normal systolic function, moderate    mitral regurgitation, mild aortic regurgitation, evidence of myocardial edema in the anterior    and lateral walls from the mid ventricle to the apex and diffuse patchy delayed enhancement    predominantly in the mid to apical lateral wall. These combined findings are consistent with    hypertrophic cardiomyopathy. Less likely possible etiologies include severe longstanding    hypertension with recent hypertensive crisis or sarcoidosis. Assessment/Plan:   1) HOCM. MRI was consistent with hypertrophic cardiomyopathy. LV function normal. Seemingly asymptomatic. Continue with BP and HR control. Encouraged to complete genetic testing with [facility] human genetics and follow up for any additional testing for his children. 2) Essential hypertension/Severe LVH. Goal BP <120/80. Continue ACE-I and will increase Toprol XL to 50 mg daily. Encouraged to continue to monitor blood pressure at home and call if it remains elevated.    3) Mitral regurgitation. Cardiac MRI 7/2019 showed moderate MR. Will continue to monitor clinically and repeat the echocardiogram prior to his follow up.     4) Obesity/ANCELMO. BMI 34. Encouraged weight loss. Will refer to sleep medicine to re-establish care. Follow up in 6 months     Thank you very much for allowing me to participate in the care of your patient. Please do not hesitate to contact me if you have any questions. Sincerely,  Ayleen Crowley. Raine Us, [address] [place] [address]  Ph: [phone]  Fax: [phone]    This note was scribed in the presence of Dr Raine Us MD by Robi Rudd RN. Physician Attestation: The scribes documentation has been prepared under my direction and personally reviewed by me in its entirety. I confirm that the note above accurately reflects all work, treatment, procedures, and medical decision making performed by me. All portions of the note including but not limited to the chief complaint, history of present illness, physical exam, assessment and plan/medical decision making were personally reviewed, edited, and updated on the day of the visit.

## 2020-11-13 ENCOUNTER — OFFICE VISIT (OUTPATIENT)
Dept: CARDIOLOGY CLINIC | Age: 45
End: 2020-11-13
Payer: COMMERCIAL

## 2020-11-13 VITALS
HEART RATE: 65 BPM | DIASTOLIC BLOOD PRESSURE: 76 MMHG | BODY MASS INDEX: 34.44 KG/M2 | OXYGEN SATURATION: 100 % | WEIGHT: 182.4 LBS | SYSTOLIC BLOOD PRESSURE: 122 MMHG | HEIGHT: 61 IN | TEMPERATURE: 98.1 F

## 2020-11-13 PROCEDURE — 1036F TOBACCO NON-USER: CPT | Performed by: INTERNAL MEDICINE

## 2020-11-13 PROCEDURE — 93000 ELECTROCARDIOGRAM COMPLETE: CPT | Performed by: INTERNAL MEDICINE

## 2020-11-13 PROCEDURE — G8417 CALC BMI ABV UP PARAM F/U: HCPCS | Performed by: INTERNAL MEDICINE

## 2020-11-13 PROCEDURE — G8484 FLU IMMUNIZE NO ADMIN: HCPCS | Performed by: INTERNAL MEDICINE

## 2020-11-13 PROCEDURE — G8427 DOCREV CUR MEDS BY ELIG CLIN: HCPCS | Performed by: INTERNAL MEDICINE

## 2020-11-13 PROCEDURE — 99214 OFFICE O/P EST MOD 30 MIN: CPT | Performed by: INTERNAL MEDICINE

## 2020-11-13 RX ORDER — METOPROLOL SUCCINATE 50 MG/1
50 TABLET, EXTENDED RELEASE ORAL DAILY
Qty: 90 TABLET | Refills: 3 | Status: SHIPPED | OUTPATIENT
Start: 2020-11-13 | End: 2021-01-25 | Stop reason: SDUPTHER

## 2020-11-23 ENCOUNTER — OFFICE VISIT (OUTPATIENT)
Dept: SLEEP MEDICINE | Age: 45
End: 2020-11-23
Payer: COMMERCIAL

## 2020-11-23 VITALS
WEIGHT: 181 LBS | DIASTOLIC BLOOD PRESSURE: 77 MMHG | RESPIRATION RATE: 16 BRPM | TEMPERATURE: 99.2 F | HEART RATE: 72 BPM | SYSTOLIC BLOOD PRESSURE: 124 MMHG | BODY MASS INDEX: 34.2 KG/M2 | OXYGEN SATURATION: 93 %

## 2020-11-23 PROCEDURE — 99203 OFFICE O/P NEW LOW 30 MIN: CPT | Performed by: PSYCHIATRY & NEUROLOGY

## 2020-11-23 PROCEDURE — G8484 FLU IMMUNIZE NO ADMIN: HCPCS | Performed by: PSYCHIATRY & NEUROLOGY

## 2020-11-23 PROCEDURE — G8417 CALC BMI ABV UP PARAM F/U: HCPCS | Performed by: PSYCHIATRY & NEUROLOGY

## 2020-11-23 PROCEDURE — 1036F TOBACCO NON-USER: CPT | Performed by: PSYCHIATRY & NEUROLOGY

## 2020-11-23 PROCEDURE — G8427 DOCREV CUR MEDS BY ELIG CLIN: HCPCS | Performed by: PSYCHIATRY & NEUROLOGY

## 2020-11-23 ASSESSMENT — SLEEP AND FATIGUE QUESTIONNAIRES
HOW LIKELY ARE YOU TO NOD OFF OR FALL ASLEEP WHEN YOU ARE A PASSENGER IN A CAR FOR AN HOUR WITHOUT A BREAK: 2
HOW LIKELY ARE YOU TO NOD OFF OR FALL ASLEEP WHILE SITTING INACTIVE IN A PUBLIC PLACE: 2
NECK CIRCUMFERENCE (INCHES): 15.5
HOW LIKELY ARE YOU TO NOD OFF OR FALL ASLEEP WHILE SITTING AND TALKING TO SOMEONE: 1
HOW LIKELY ARE YOU TO NOD OFF OR FALL ASLEEP WHILE SITTING QUIETLY AFTER LUNCH WITHOUT ALCOHOL: 1
HOW LIKELY ARE YOU TO NOD OFF OR FALL ASLEEP WHILE SITTING AND READING: 3
HOW LIKELY ARE YOU TO NOD OFF OR FALL ASLEEP IN A CAR, WHILE STOPPED FOR A FEW MINUTES IN TRAFFIC: 0
HOW LIKELY ARE YOU TO NOD OFF OR FALL ASLEEP WHILE LYING DOWN TO REST IN THE AFTERNOON WHEN CIRCUMSTANCES PERMIT: 3
ESS TOTAL SCORE: 15
HOW LIKELY ARE YOU TO NOD OFF OR FALL ASLEEP WHILE WATCHING TV: 3

## 2020-11-23 ASSESSMENT — ENCOUNTER SYMPTOMS
ALLERGIC/IMMUNOLOGIC NEGATIVE: 1
EYES NEGATIVE: 1
APNEA: 0
GASTROINTESTINAL NEGATIVE: 1
CHOKING: 0

## 2020-11-23 NOTE — PATIENT INSTRUCTIONS
Orders Placed This Encounter   Procedures    Sleep Study with PAP Titration     Standing Status:   Future     Standing Expiration Date:   11/23/2021     Order Specific Question:   Sleep Study Titration Type     Answer:   Split Night Study (Baseline followed by PAP Titration)     Order Specific Question:   Location For Sleep Study     Answer:   [place]     Order Specific Question:   Select Sleep Lab Location     Answer:   [facility]        Patient Education        Learning About CPAP for Sleep Apnea  What is CPAP? CPAP is a small machine that you use at home every night while you sleep. It increases air pressure in your throat to keep your airway open. When you have sleep apnea, this can help you sleep better so you feel much better. CPAP stands for \"continuous positive airway pressure. \"  The CPAP machine will have one of the following:  · A mask that covers your nose and mouth  · Prongs that fit into your nose  · A mask that covers your nose only, which is the most common type. This type is called NCPAP. The N stands for \"nasal.\"  Why is it done? CPAP is usually the best treatment for obstructive sleep apnea. It is the first treatment choice and the most widely used. CPAP:  · Helps you have more normal sleep, so you feel less sleepy and more alert during the daytime. · May help keep heart failure or other heart problems from getting worse. · May help lower your blood pressure. If you use CPAP, your bed partner may also sleep better. That's because you aren't snoring or restless. Your doctor may suggest CPAP if you have:  · Moderate to severe sleep apnea. · Sleep apnea and coronary artery disease (CAD). · Sleep apnea and heart failure. What are the side effects? Some people who use CPAP have:  · A dry or stuffy nose and a sore throat. · Irritated skin on the face. · Sore eyes. · Bloating. How can you care for yourself?   If using CPAP is not comfortable, or if you have certain side effects, work with your doctor to fix them. Here are some things you can try:  · Be sure the mask or nasal prongs fit well. · See if your doctor can adjust the pressure of your CPAP. · If your nose is dry, try a humidifier. · If your nose is runny or stuffy, try decongestant medicine or a steroid nasal spray. Be safe with medicines. Read and follow all instructions on the label. Do not use the medicine longer than the label says. If these things don't help, you might try a different type of machine. Some machines have air pressure that adjusts on its own. Others have air pressures that are different when you breathe in than when you breathe out. This may reduce discomfort caused by too much pressure in your nose. Where can you learn more? Go to https://ufindadspepiceweb.[employer] org and sign in to your [employer] account. Enter I215 in the Tigerlily box to learn more about \"Learning About CPAP for Sleep Apnea. \"     If you do not have an account, please click on the \"Sign Up Now\" link. Current as of: February 24, 2020               Content Version: 12.6  © 8123-1275 [employer]. Care instructions adapted under license by [facility] ([facility]). If you have questions about a medical condition or this instruction, always ask your healthcare professional. [address] any warranty or liability for your use of this information.

## 2020-11-23 NOTE — PROGRESS NOTES
MD MELANI Frazier Board Certified in Sleep Medicine  Certified [facility] Sleep Medicine  Board Certified in Neurology [address]  [address] [facility] [address]  [address] [address] [place] [facility] [address], [address]           [address]  [address] [facility] [address] [phone]    Subjective:     Patient ID: Car Romero is a 39 y.o. male. Chief Complaint   Patient presents with    Sleep Apnea     NP ANCELMO       HPI:        Car Romero is a 39 y.o. male referred by Dr Landon Armendariz for a sleep evaluation. Patient  was diagnosed with  obstructive sleep apnea about 6 years ago, currently of PAP machine at [facility], last sleep study was 6 years ago, last PAP titration about 6 years ago. Patient is using the PAP machine  in total average of 6 hours a night, according to the patient . This patient has lost about 15 pounds since last PAP titration    SLEEP SCHEDULE: Goes to bed around 10 PM in the weekdays and 11 PM in the weekends. It usually takes the patient 5-15 minutes to fall asleep. The patient gets up 1-2 per night to go to the bathroom. The Patient finally gets up at 6 AM during the weekdays and 8 AM in the weekends. The patient has restless sleep with frequent arousals in addition to the Patient has significant daytime sleepiness. The Patient scored Total score: 15 on [place] Sleepiness Scale ( more than 10 is indicative of daytime sleepiness)and 28 in fatigue scale ( more than 36 is indicative of daytime fatigue). The patient takes no naps. Previous evaluation and treatment has included- PSG with C PAP titration. DOT/CDL - N/A  FAA/'slicense - N/A  The patient HTN is stable.      Previous Report(s) Reviewed: historical medical records       Social History     Socioeconomic History    Marital status:      Spouse name: Not on file    Number of children: Not on file    Years of education: Not on file    Highest education level: Not on file   Occupational History    Not on file   Social Needs    Financial resource strain: Not on file    Food insecurity     Worry: Not on file     Inability: Not on file    Transportation needs     Medical: Not on file     Non-medical: Not on file   Tobacco Use    Smoking status: Never Smoker    Smokeless tobacco: Never Used   Substance and Sexual Activity    Alcohol use: No     Alcohol/week: 0.0 standard drinks    Drug use: No    Sexual activity: Yes   Lifestyle    Physical activity     Days per week: Not on file     Minutes per session: Not on file    Stress: Not on file   Relationships    Social connections     Talks on phone: Not on file     Gets together: Not on file     Attends Church service: Not on file     Active member of club or organization: Not on file     Attends meetings of clubs or organizations: Not on file     Relationship status: Not on file    Intimate partner violence     Fear of current or ex partner: Not on file     Emotionally abused: Not on file     Physically abused: Not on file     Forced sexual activity: Not on file   Other Topics Concern    Not on file   Social History Narrative    Not on file       Prior to Admission medications    Medication Sig Start Date End Date Taking?  Authorizing Provider   NAPROXEN DR PO Take by mouth   Yes Historical Provider, MD   metoprolol succinate (TOPROL XL) 50 MG extended release tablet Take 1 tablet by mouth daily 11/13/20  Yes Nicolas Sy MD   lisinopril (PRINIVIL;ZESTRIL) 20 MG tablet Take 1 tablet by mouth daily 10/29/20  Yes Nicolas Sy MD   Multiple Vitamins-Minerals (MULTIVITAMIN ADULT PO) Take 1 tablet by mouth daily   Yes Historical Provider, MD   ibuprofen (IBU) 600 MG tablet Take 1 tablet by mouth every 8 hours as needed for Pain 8/25/20 9/4/20  Megan Perry PA-C       Allergies as of

## 2020-11-27 ENCOUNTER — OFFICE VISIT (OUTPATIENT)
Dept: PRIMARY CARE CLINIC | Age: 45
End: 2020-11-27
Payer: COMMERCIAL

## 2020-11-27 PROCEDURE — G8417 CALC BMI ABV UP PARAM F/U: HCPCS | Performed by: NURSE PRACTITIONER

## 2020-11-27 PROCEDURE — G8428 CUR MEDS NOT DOCUMENT: HCPCS | Performed by: NURSE PRACTITIONER

## 2020-11-27 PROCEDURE — 99211 OFF/OP EST MAY X REQ PHY/QHP: CPT | Performed by: NURSE PRACTITIONER

## 2020-11-28 LAB — SARS-COV-2: DETECTED

## 2020-12-18 ENCOUNTER — OFFICE VISIT (OUTPATIENT)
Dept: PRIMARY CARE CLINIC | Age: 45
End: 2020-12-18
Payer: COMMERCIAL

## 2020-12-18 LAB — SARS-COV-2: NOT DETECTED

## 2020-12-18 PROCEDURE — G8428 CUR MEDS NOT DOCUMENT: HCPCS | Performed by: NURSE PRACTITIONER

## 2020-12-18 PROCEDURE — G8417 CALC BMI ABV UP PARAM F/U: HCPCS | Performed by: NURSE PRACTITIONER

## 2020-12-18 PROCEDURE — 99211 OFF/OP EST MAY X REQ PHY/QHP: CPT | Performed by: NURSE PRACTITIONER

## 2020-12-21 ENCOUNTER — HOSPITAL ENCOUNTER (OUTPATIENT)
Dept: SLEEP CENTER | Age: 45
Discharge: HOME | End: 2020-12-21
Payer: COMMERCIAL

## 2020-12-21 PROCEDURE — 95811 POLYSOM 6/>YRS CPAP 4/> PARM: CPT

## 2020-12-21 PROCEDURE — 95811 POLYSOM 6/>YRS CPAP 4/> PARM: CPT | Performed by: PSYCHIATRY & NEUROLOGY

## 2020-12-22 NOTE — PROGRESS NOTES
Cesar Push         : 1975  [] [address]     [] [address]      [] [facility]     [][facility]    [] [employer]  [] [facility]   [] Other:  Diagnosis: [x] ANCELMO (G47.33) [] CSA (G47.31) [] Apnea (G47.30)   Length of Need: [] 12 Months [x] 99 Months [] Other:    Machine (IVY!): [x] Respironics Dream Station      Auto [x] ResMed AirSense     Auto [] Other:     [x]  CPAP () [] Bilevel ()   Mode: [x] Auto [] Spontaneous    Mode: [] Auto [] Spontaneous           11 cm                 Comfort Settings:   - Ramp Pressure: 5 cmH2O                                        - Ramp time: 15 min                                     -  Flex/EPR - 3 full time                                    - For ResMed Bilevel (TiMax-4 sec   TiMin- 0.2 sec)     Humidifier: [x] Heated ()        [x] Water chamber replacement ()/ 1 per 6 months        Mask:   [x] Nasal () /1 per 3 months [x] Full Face () /1 per 3 months   [x] Patient choice -Size and fit mask [x] Patient Choice - Size and fit mask   [] Dispense:  [] Dispense:    [x] Headgear () / 1 per 3 months [x] Headgear () / 1 per 3 months   [x] Replacement Nasal Cushion ()/2 per month [x] Interface Replacement ()/1 per month   [x] Replacement Nasal Pillows ()/2 per month         Tubing: [x] Heated ()/1 per 3 months    [] Standard ()/1 per 3 months [] Other:           Filters: [x] Non-disposable ()/1 per 6 months     [x] Ultra-Fine, Disposable ()/2 per month        Miscellaneous: [x] Chin Strap ()/ 1 per 6 months [] O2 bleed-in:       LPM   [] Oximetry on CPAP/Bilevel []  Other:          Start Order Date: 20    MEDICAL JUSTIFICATION:  I, the undersigned, certify that the above prescribed supplies are medically necessary for this patients wellbeing.   In my opinion, the supplies are both reasonable and necessary in reference to accepted standards of medicalpractice in treatment of this patients condition.     Genia Dior MD      NPI: [id number]       Order Signed Date: 12/22/20    Electronically signed by Genia Dior MD on 12/22/2020 at 2:38 PM

## 2020-12-23 ENCOUNTER — TELEPHONE (OUTPATIENT)
Dept: PULMONOLOGY | Age: 45
End: 2020-12-23

## 2020-12-23 NOTE — TELEPHONE ENCOUNTER
Split night sleep study showed severe ANCELMO. AHI was 65.6 per hr. And O2 Desaturations to 50%. CPAP was initiated  And adequate control of CPAP pressure of 11 cm is suggested. I left a message for the patient to call and obtain his result. He chose [facility] as his DME. Order will be sent there.

## 2021-05-17 NOTE — PROGRESS NOTES
[facility]      Cardiology Consult    Courtier Bao Verdugo  1975    May 18, 2021      Reason for Referral: LVH, HOCM     CC: \"Feeling good. \"     HPI:  The patient is 39 y.o. male with a past medical history significant for HTN/LVH, obesity, and HOCM who presents today for management of LVH and HOCM. He presented to the hospital with complaints of acute RUQ abdominal pain with associated nausea and vomiting. He also reported a fever and was evaluated by GI and general surgery for choledocholithiasis. Cardiology was originally consulted for a pre-operative risk assessment for an abnormal EKG. He underwent an ERCP 5/16/19 and cholecystectomy 5/17/19. He had known LVH from previous testing at [facility] but he denied routine follow-up with cardiology. He believed his BP had been controlled on medications but had many years without routine health care. He completed an echocardiogram that showed severe LVH and HOCM. His cardiac MRI was consistent with hypertrophic cardiomyopathy with LV function normal. He completed a telemedicine visit with [facility] human genetics but had not completed the testing that was mailed to his house. He stated he has 3 children and they have not undergone any genetic or cardiac testing. Today, he states overall he is doing well. He denies any new cardiac complaints. He states he tries to remain active at the gym and denies any exertional symptoms or syncope. He denies any chest pains or worsening shortness of breath. He reports compliance with his medications and tolerating. He denies any abnormal bleeding or bruising. Patient denies exertional chest pain/pressure, dyspnea at rest, worsening RAMIREZ, PND, orthopnea, palpitations, lightheadedness, weight changes, changes in LE edema, and syncope. He states he is following with sleep medicine but will need to schedule a appointment to further discuss his equipment.     Past Medical History:   Diagnosis Date    Hyperlipidemia     Hypertension     LVH (left ventricular hypertrophy)     Sleep apnea     USES C-PAP    Wears contact lenses      Past Surgical History:   Procedure Laterality Date    CHOLECYSTECTOMY, LAPAROSCOPIC N/A 5/17/2019    LAPAROSCOPIC CHOLECYSTECTOMY performed by Ron Ortiz MD at [address] ERCP N/A 5/16/2019    ERCP SPHINCTER/PAPILLOTOMY performed by Naz Yip MD at [address] ERCP N/A 5/16/2019    ERCP BIOPSY performed by Naz Yip MD at [address] ERCP N/A 5/16/2019    ERCP STENT INSERTION performed by Naz Yip MD at [address] ERCP N/A 5/16/2019    ERCP STONE REMOVAL VIA BALLOON SWEEP performed by Naz Yip MD at [address] ERCP N/A 7/2/2019    ENDOSCOPIC RETROGRADE CHOLANGIOPANCREATOGRAPHY, WITH STENT REMOVAL performed by Naz Yip MD at [address] ERCP N/A 7/2/2019    ERCP BALLOON SWEEP performed by Naz Yip MD at [address] History   Problem Relation Age of Onset    Lung Cancer Mother     High Blood Pressure Father      Social History     Tobacco Use    Smoking status: Never Smoker    Smokeless tobacco: Never Used   Vaping Use    Vaping Use: Never used   Substance Use Topics    Alcohol use: No     Alcohol/week: 0.0 standard drinks    Drug use: No     No Known Allergies  Current Outpatient Medications   Medication Sig Dispense Refill    lisinopril (PRINIVIL;ZESTRIL) 20 MG tablet Take 1 tablet by mouth daily 90 tablet 3    metoprolol succinate (TOPROL XL) 50 MG extended release tablet Take 1 tablet by mouth daily 90 tablet 3    Multiple Vitamins-Minerals (MULTIVITAMIN ADULT PO) Take 1 tablet by mouth daily       No current facility-administered medications for this visit. Review of Systems:  · Constitutional: no unanticipated weight loss. There's been no change in energy level, sleep pattern, or activity level. No fevers, chills. · Eyes: No visual changes or diplopia.  No scleral icterus. · ENT: No Headaches, hearing loss or vertigo. No mouth sores or sore throat. · Cardiovascular: as reviewed in HPI  · Respiratory: No cough or wheezing, no sputum production. No hematemesis. · Gastrointestinal: No abdominal pain, appetite loss, blood in stools. No change in bowel or bladder habits. · Genitourinary: No dysuria, trouble voiding, or hematuria. · Musculoskeletal:  No gait disturbance, no joint complaints. · Integumentary: No rash or pruritis. · Neurological: No headache, diplopia, change in muscle strength, numbness or tingling. · Psychiatric: No anxiety or depression. · Endocrine: No temperature intolerance. No excessive thirst, fluid intake, or urination. No tremor. · Hematologic/Lymphatic: No abnormal bruising or bleeding, blood clots or swollen lymph nodes. · Allergic/Immunologic: No nasal congestion or hives. Physical Exam:   /76   Pulse 60   Ht 5' 1\" (1.549 m)   Wt 189 lb 9.6 oz (86 kg)   SpO2 99%   BMI 35.82 kg/m²   Wt Readings from Last 3 Encounters:   05/18/21 189 lb 9.6 oz (86 kg)   11/23/20 181 lb (82.1 kg)   11/13/20 182 lb 6.4 oz (82.7 kg)     Constitutional: He is oriented to person, place, and time. He appears well-developed and well-nourished. In no acute distress. Head: Normocephalic and atraumatic. Pupils equal and round. Neck: Neck supple. No JVP or carotid bruit appreciated. No mass and no thyromegaly present. No lymphadenopathy present. Cardiovascular: Normal rate. Normal heart sounds. Exam reveals no gallop and no friction rub. No murmur noted today. Pulmonary/Chest: Effort normal and breath sounds normal. No respiratory distress. He has no wheezes, rhonchi or rales. Abdominal: Soft, non-tender. Bowel sounds are normal. He exhibits no organomegaly, mass or bruit. Extremities: No edema, cyanosis, or clubbing. Pulses are 2+ radial/dorsalis pedis/posterior tibial/carotid bilaterally. Neurological: No gross cranial nerve deficit. Coordination normal.   Skin: Skin is warm and dry. There is no rash or diaphoresis. Psychiatric: He has a normal mood and affect. His speech is normal and behavior is normal.     Lab Review:   FLP:  No results found for: TRIG, HDL, LDLCALC, LDLDIRECT, LABVLDL  BUN/Creatinine:    Lab Results   Component Value Date    BUN 13 05/18/2019    CREATININE 0.8 05/18/2019     EKG Interpretation: 6/11/19 Sinus rhythm. LVH with repolarization abnormality. 5/18/21 Sinus bradycardia. LVH with repolarization abnormalities. Image Review:     Stress test: 3/2018  Pharmacologic vasodilator stress myocardial perfusion study without evidence of myocardial ischemia or infarct. Echo: 5/16/19   Overall left ventricular systolic function appears hyperdynamic. Ejection fraction is visually estimated to be >70%. There is severe concentric left ventricular hypertrophy. No regional wall motion abnormalities are noted. The right ventricle is normal in size and function. Possible systolic anterior motion (GOSIA) of anterior leaflet. Mild mitral regurgitation. There is a late peaking LV outflow tract gradient which significantly increases with valsalva (peak velocity increases from 2 to 5.5 m/s). Findings are suggestive of HOCM.     Cardiac MRI 7/5/19  Overall, this cardiac MRI shows a non-dilated left ventricle with preserved left ventricular    ejection fraction, severe concentric left ventricular hypertrophy with a maximal thickness of    20 mm in the mid septum, a non-dilated right ventricle with normal systolic function, moderate    mitral regurgitation, mild aortic regurgitation, evidence of myocardial edema in the anterior    and lateral walls from the mid ventricle to the apex and diffuse patchy delayed enhancement    predominantly in the mid to apical lateral wall. These combined findings are consistent with    hypertrophic cardiomyopathy.  Less likely possible etiologies include severe longstanding    hypertension with recent hypertensive crisis or sarcoidosis. Assessment/Plan:   1) HOCM. MRI was consistent with hypertrophic cardiomyopathy. LV function normal. Seemingly asymptomatic. Continue with BP and HR control. Encouraged to complete genetic testing with [facility] human genetics and follow up for any additional testing for his children. Will repeat the echocardiogram.     2) Essential hypertension/Severe LVH. Goal BP <120/80. Continue ACE-I and Toprol XL to 50 mg daily. Encouraged to continue to monitor blood pressure at home and call if it remains elevated.    3) Mitral regurgitation. Cardiac MRI 7/2019 showed moderate MR. Will continue to monitor clinically and repeat the echocardiogram.     4) Obesity/ANCELMO. BMI 35.8. Encouraged weight loss. Encouraged to re-establish care for equipment issues. Follow up in 6 months     Thank you very much for allowing me to participate in the care of your patient. Please do not hesitate to contact me if you have any questions. Sincerely,  Frank Grewal. Randi Brown, [address] [place] [address]  Ph: [phone]  Fax: [phone]      This note was scribed in the presence of Dr Randi Brown MD by Idris Carter, MARITZA. Physician Attestation: The scribes documentation has been prepared under my direction and personally reviewed by me in its entirety. I confirm that the note above accurately reflects all work, treatment, procedures, and medical decision making performed by me. All portions of the note including but not limited to the chief complaint, history of present illness, physical exam, assessment and plan/medical decision making were personally reviewed, edited, and updated on the day of the visit.

## 2021-05-17 NOTE — PATIENT INSTRUCTIONS
Patient Education        DASH Diet: Care Instructions  Your Care Instructions     The DASH diet is an eating plan that can help lower your blood pressure. DASH stands for Dietary Approaches to Stop Hypertension. Hypertension is high blood pressure. The DASH diet focuses on eating foods that are high in calcium, potassium, and magnesium. These nutrients can lower blood pressure. The foods that are highest in these nutrients are fruits, vegetables, low-fat dairy products, nuts, seeds, and legumes. But taking calcium, potassium, and magnesium supplements instead of eating foods that are high in those nutrients does not have the same effect. The DASH diet also includes whole grains, fish, and poultry. The DASH diet is one of several lifestyle changes your doctor may recommend to lower your high blood pressure. Your doctor may also want you to decrease the amount of sodium in your diet. Lowering sodium while following the DASH diet can lower blood pressure even further than just the DASH diet alone. Follow-up care is a key part of your treatment and safety. Be sure to make and go to all appointments, and call your doctor if you are having problems. It's also a good idea to know your test results and keep a list of the medicines you take. How can you care for yourself at home? Following the DASH diet  · Eat 4 to 5 servings of fruit each day. A serving is 1 medium-sized piece of fruit, ½ cup chopped or canned fruit, 1/4 cup dried fruit, or 4 ounces (½ cup) of fruit juice. Choose fruit more often than fruit juice. · Eat 4 to 5 servings of vegetables each day. A serving is 1 cup of lettuce or raw leafy vegetables, ½ cup of chopped or cooked vegetables, or 4 ounces (½ cup) of vegetable juice. Choose vegetables more often than vegetable juice. · Get 2 to 3 servings of low-fat and fat-free dairy each day. A serving is 8 ounces of milk, 1 cup of yogurt, or 1 ½ ounces of cheese. · Eat 6 to 8 servings of grains each day. A serving is 1 slice of bread, 1 ounce of dry cereal, or ½ cup of cooked rice, pasta, or cooked cereal. Try to choose whole-grain products as much as possible. · Limit lean meat, poultry, and fish to 2 servings each day. A serving is 3 ounces, about the size of a deck of cards. · Eat 4 to 5 servings of nuts, seeds, and legumes (cooked dried beans, lentils, and split peas) each week. A serving is 1/3 cup of nuts, 2 tablespoons of seeds, or ½ cup of cooked beans or peas. · Limit fats and oils to 2 to 3 servings each day. A serving is 1 teaspoon of vegetable oil or 2 tablespoons of salad dressing. · Limit sweets and added sugars to 5 servings or less a week. A serving is 1 tablespoon jelly or jam, ½ cup sorbet, or 1 cup of lemonade. · Eat less than 2,300 milligrams (mg) of sodium a day. If you limit your sodium to 1,500 mg a day, you can lower your blood pressure even more. · Be aware that all of these are the suggested number of servings for people who eat 1,800 to 2,000 calories a day. Your recommended number of servings may be different if you need more or fewer calories. Tips for success  · Start small. Do not try to make dramatic changes to your diet all at once. You might feel that you are missing out on your favorite foods and then be more likely to not follow the plan. Make small changes, and stick with them. Once those changes become habit, add a few more changes. · Try some of the following:  ? Make it a goal to eat a fruit or vegetable at every meal and at snacks. This will make it easy to get the recommended amount of fruits and vegetables each day. ? Try yogurt topped with fruit and nuts for a snack or healthy dessert. ? Add lettuce, tomato, cucumber, and onion to sandwiches. ? Combine a ready-made pizza crust with low-fat mozzarella cheese and lots of vegetable toppings. Try using tomatoes, squash, spinach, broccoli, carrots, cauliflower, and onions. ?  Have a variety of cut-up vegetables with

## 2021-05-18 ENCOUNTER — OFFICE VISIT (OUTPATIENT)
Dept: CARDIOLOGY CLINIC | Age: 46
End: 2021-05-18
Payer: COMMERCIAL

## 2021-05-18 VITALS
DIASTOLIC BLOOD PRESSURE: 76 MMHG | HEIGHT: 61 IN | OXYGEN SATURATION: 99 % | BODY MASS INDEX: 35.8 KG/M2 | SYSTOLIC BLOOD PRESSURE: 100 MMHG | HEART RATE: 60 BPM | WEIGHT: 189.6 LBS

## 2021-05-18 DIAGNOSIS — I51.7 LVH (LEFT VENTRICULAR HYPERTROPHY): ICD-10-CM

## 2021-05-18 DIAGNOSIS — I10 ESSENTIAL HYPERTENSION: ICD-10-CM

## 2021-05-18 DIAGNOSIS — I42.1 HOCM (HYPERTROPHIC OBSTRUCTIVE CARDIOMYOPATHY) (HCC): ICD-10-CM

## 2021-05-18 DIAGNOSIS — I42.1 HOCM (HYPERTROPHIC OBSTRUCTIVE CARDIOMYOPATHY) (HCC): Primary | ICD-10-CM

## 2021-05-18 DIAGNOSIS — G47.33 OSA (OBSTRUCTIVE SLEEP APNEA): ICD-10-CM

## 2021-05-18 DIAGNOSIS — I34.0 NONRHEUMATIC MITRAL VALVE REGURGITATION: ICD-10-CM

## 2021-05-18 LAB
A/G RATIO: 1.5 (ref 1.1–2.2)
ALBUMIN SERPL-MCNC: 4.3 G/DL (ref 3.4–5)
ALP BLD-CCNC: 63 U/L (ref 40–129)
ALT SERPL-CCNC: 28 U/L (ref 10–40)
ANION GAP SERPL CALCULATED.3IONS-SCNC: 8 MMOL/L (ref 3–16)
AST SERPL-CCNC: 27 U/L (ref 15–37)
BILIRUB SERPL-MCNC: 0.5 MG/DL (ref 0–1)
BUN BLDV-MCNC: 23 MG/DL (ref 7–20)
CALCIUM SERPL-MCNC: 9.3 MG/DL (ref 8.3–10.6)
CHLORIDE BLD-SCNC: 103 MMOL/L (ref 99–110)
CHOLESTEROL, FASTING: 198 MG/DL (ref 0–199)
CO2: 26 MMOL/L (ref 21–32)
CREAT SERPL-MCNC: 1.2 MG/DL (ref 0.9–1.3)
GFR AFRICAN AMERICAN: >60
GFR NON-AFRICAN AMERICAN: >60
GLOBULIN: 2.9 G/DL
GLUCOSE BLD-MCNC: 86 MG/DL (ref 70–99)
HCT VFR BLD CALC: 41 % (ref 40.5–52.5)
HDLC SERPL-MCNC: 56 MG/DL (ref 40–60)
HEMOGLOBIN: 14.1 G/DL (ref 13.5–17.5)
LDL CHOLESTEROL CALCULATED: 123 MG/DL
MCH RBC QN AUTO: 31 PG (ref 26–34)
MCHC RBC AUTO-ENTMCNC: 34.3 G/DL (ref 31–36)
MCV RBC AUTO: 90.2 FL (ref 80–100)
PDW BLD-RTO: 13.3 % (ref 12.4–15.4)
PLATELET # BLD: 219 K/UL (ref 135–450)
PMV BLD AUTO: 8.6 FL (ref 5–10.5)
POTASSIUM SERPL-SCNC: 4.6 MMOL/L (ref 3.5–5.1)
RBC # BLD: 4.55 M/UL (ref 4.2–5.9)
SODIUM BLD-SCNC: 137 MMOL/L (ref 136–145)
TOTAL PROTEIN: 7.2 G/DL (ref 6.4–8.2)
TRIGLYCERIDE, FASTING: 96 MG/DL (ref 0–150)
TSH REFLEX: 1.42 UIU/ML (ref 0.27–4.2)
VLDLC SERPL CALC-MCNC: 19 MG/DL
WBC # BLD: 9.6 K/UL (ref 4–11)

## 2021-05-18 PROCEDURE — G8417 CALC BMI ABV UP PARAM F/U: HCPCS | Performed by: INTERNAL MEDICINE

## 2021-05-18 PROCEDURE — 93000 ELECTROCARDIOGRAM COMPLETE: CPT | Performed by: INTERNAL MEDICINE

## 2021-05-18 PROCEDURE — 1036F TOBACCO NON-USER: CPT | Performed by: INTERNAL MEDICINE

## 2021-05-18 PROCEDURE — G8427 DOCREV CUR MEDS BY ELIG CLIN: HCPCS | Performed by: INTERNAL MEDICINE

## 2021-05-18 PROCEDURE — 99214 OFFICE O/P EST MOD 30 MIN: CPT | Performed by: INTERNAL MEDICINE

## 2021-05-19 LAB
ESTIMATED AVERAGE GLUCOSE: 125.5 MG/DL
HBA1C MFR BLD: 6 %

## 2021-06-25 ENCOUNTER — HOSPITAL ENCOUNTER (OUTPATIENT)
Dept: NON INVASIVE DIAGNOSTICS | Age: 46
Discharge: HOME | End: 2021-06-25
Payer: COMMERCIAL

## 2021-06-25 DIAGNOSIS — I51.7 LVH (LEFT VENTRICULAR HYPERTROPHY): ICD-10-CM

## 2021-06-25 DIAGNOSIS — I10 ESSENTIAL HYPERTENSION: ICD-10-CM

## 2021-06-25 DIAGNOSIS — G47.33 OSA (OBSTRUCTIVE SLEEP APNEA): ICD-10-CM

## 2021-06-25 DIAGNOSIS — I34.0 NONRHEUMATIC MITRAL VALVE REGURGITATION: ICD-10-CM

## 2021-06-25 DIAGNOSIS — I42.1 HOCM (HYPERTROPHIC OBSTRUCTIVE CARDIOMYOPATHY) (HCC): ICD-10-CM

## 2021-06-25 LAB
LV EF: 60 %
LVEF MODALITY: NORMAL

## 2021-06-25 PROCEDURE — C8929 TTE W OR WO FOL WCON,DOPPLER: HCPCS

## 2021-06-25 PROCEDURE — 6360000004 HC RX CONTRAST MEDICATION: Performed by: INTERNAL MEDICINE

## 2021-06-25 RX ADMIN — PERFLUTREN 1.5 ML: 6.52 INJECTION, SUSPENSION INTRAVENOUS at 09:41

## 2021-07-27 RX ORDER — METOPROLOL SUCCINATE 50 MG/1
50 TABLET, EXTENDED RELEASE ORAL DAILY
Qty: 90 TABLET | Refills: 3 | Status: SHIPPED | OUTPATIENT
Start: 2021-07-27 | End: 2021-11-09 | Stop reason: SDUPTHER

## 2021-11-08 NOTE — PROGRESS NOTES
[address]      Cardiology Consult    Courtier Katelin Hobson  1975 November 9, 2021      Reason for Referral: LVH, HOCM     CC: \"I feel fine. \"     HPI:  The patient is 39 y.o. male with a past medical history significant for HTN/LVH, obesity, and HOCM who presents today for management of LVH and HOCM. He presented to the hospital with complaints of acute RUQ abdominal pain with associated nausea and vomiting. He also reported a fever and was evaluated by GI and general surgery for choledocholithiasis. Cardiology was originally consulted for a pre-operative risk assessment for an abnormal EKG. He underwent an ERCP 5/16/19 and cholecystectomy 5/17/19. He had known LVH from previous testing at [facility] but he denied routine follow-up with cardiology. He believed his BP had been controlled on medications but had many years without routine health care. He completed an echocardiogram that showed severe LVH and HOCM. His cardiac MRI was consistent with hypertrophic cardiomyopathy with LV function normal. He completed a telemedicine visit with [facility] human genetics but had not completed the testing that was mailed to his house. He stated he has 3 children and they have not undergone any genetic or cardiac testing. Today, he states overall he is doing well. He denies any new cardiac complaints. He states he tries to remain active at the gym and denies any exertional symptoms or syncope. He denies any chest pains or worsening shortness of breath. He states he discontinued the lisinopril and uncertain if he should remain both the lisinopril and Toprol. He denies any adverse reactions and reports compliance with his medications. He denies any abnormal bleeding or bruising. He states he is following with sleep medicine but will need to schedule a appointment to further discuss his equipment.  Patient denies exertional chest pain/pressure, dyspnea at rest, RAMIREZ, PND, orthopnea, palpitations, lightheadedness, weight loss. There's been no change in energy level, sleep pattern, or activity level. No fevers, chills. · Eyes: No visual changes or diplopia. No scleral icterus. · ENT: No Headaches, hearing loss or vertigo. No mouth sores or sore throat. · Cardiovascular: as reviewed in HPI  · Respiratory: No cough or wheezing, no sputum production. No hematemesis. · Gastrointestinal: No abdominal pain, appetite loss, blood in stools. No change in bowel or bladder habits. · Genitourinary: No dysuria, trouble voiding, or hematuria. · Musculoskeletal:  No gait disturbance, no joint complaints. · Integumentary: No rash or pruritis. · Neurological: No headache, diplopia, change in muscle strength, numbness or tingling. · Psychiatric: No anxiety or depression. · Endocrine: No temperature intolerance. No excessive thirst, fluid intake, or urination. No tremor. · Hematologic/Lymphatic: No abnormal bruising or bleeding, blood clots or swollen lymph nodes. · Allergic/Immunologic: No nasal congestion or hives. Physical Exam:   /84   Pulse 71   Ht 5' 1\" (1.549 m)   Wt 193 lb (87.5 kg)   SpO2 98%   BMI 36.47 kg/m²   Wt Readings from Last 3 Encounters:   11/09/21 193 lb (87.5 kg)   05/18/21 189 lb 9.6 oz (86 kg)   11/23/20 181 lb (82.1 kg)     Constitutional: He is oriented to person, place, and time. He appears well-developed and well-nourished. In no acute distress. Head: Normocephalic and atraumatic. Pupils equal and round. Neck: Neck supple. No JVP or carotid bruit appreciated. No mass and no thyromegaly present. No lymphadenopathy present. Cardiovascular: Normal rate. Normal heart sounds. Exam reveals no gallop and no friction rub. No murmur noted today. Pulmonary/Chest: Effort normal and breath sounds normal. No respiratory distress. He has no wheezes, rhonchi or rales. Abdominal: Soft, non-tender. Bowel sounds are normal. He exhibits no organomegaly, mass or bruit.    Extremities: No edema, cyanosis, or clubbing. Pulses are 2+ radial/dorsalis pedis/posterior tibial/carotid bilaterally. Neurological: No gross cranial nerve deficit. Coordination normal.   Skin: Skin is warm and dry. There is no rash or diaphoresis. Psychiatric: He has a normal mood and affect. His speech is normal and behavior is normal.     Lab Review:   FLP:    Lab Results   Component Value Date    HDL 56 05/18/2021    LDLCALC 123 05/18/2021    LABVLDL 19 05/18/2021     BUN/Creatinine:    Lab Results   Component Value Date    BUN 23 05/18/2021    CREATININE 1.2 05/18/2021     EKG Interpretation: 6/11/19 Sinus rhythm. LVH with repolarization abnormality. 5/18/21 Sinus bradycardia. LVH with repolarization abnormalities. Image Review:     Stress test: 3/2018  Pharmacologic vasodilator stress myocardial perfusion study without evidence of myocardial ischemia or infarct. Echo: 5/16/19   Overall left ventricular systolic function appears hyperdynamic. Ejection fraction is visually estimated to be >70%. There is severe concentric left ventricular hypertrophy. No regional wall motion abnormalities are noted. The right ventricle is normal in size and function. Possible systolic anterior motion (GOSIA) of anterior leaflet. Mild mitral regurgitation. There is a late peaking LV outflow tract gradient which significantly increases with valsalva (peak velocity increases from 2 to 5.5 m/s).  Findings are suggestive of HOCM.     Cardiac MRI 7/5/19  Overall, this cardiac MRI shows a non-dilated left ventricle with preserved left ventricular    ejection fraction, severe concentric left ventricular hypertrophy with a maximal thickness of    20 mm in the mid septum, a non-dilated right ventricle with normal systolic function, moderate    mitral regurgitation, mild aortic regurgitation, evidence of myocardial edema in the anterior    and lateral walls from the mid ventricle to the apex and diffuse patchy delayed enhancement    predominantly in the mid to apical lateral wall. These combined findings are consistent with    hypertrophic cardiomyopathy. Less likely possible etiologies include severe longstanding    hypertension with recent hypertensive crisis or sarcoidosis. Echo 6/25/21   There is severe concentric left ventricular hypertrophy. Ejection fraction is visually estimated to be 60%. Grade I diastolic dysfunction with normal LV filling pressures. No evidence of mitral valve stenosis. Mild mitral regurgitation. The right ventricle is normal in size and function. Assessment/Plan:   1) HOCM. MRI was consistent with hypertrophic cardiomyopathy. LV function normal. Seemingly asymptomatic. Continue with BP and HR control. Encouraged to complete genetic testing with [facility] human genetics and follow up for any additional testing for his children. Will repeat the echocardiogram.     2) Essential hypertension/Severe LVH. Controlled. Goal BP <120/80. Resume ACE-I and continue Toprol XL 50 mg daily. Encouraged to continue to monitor blood pressure at home and call if it remains elevated.    3) Mitral regurgitation. Cardiac MRI 7/2019 showed moderate MR. Will continue to monitor clinically and repeat the echocardiogram.     4) Obesity/ANCELMO. BMI 36. Encouraged weight loss. Encouraged to re-establish care for equipment issues. Follow up in 6 months     Thank you very much for allowing me to participate in the care of your patient. Please do not hesitate to contact me if you have any questions. Sincerely,  Terry Marquez. Renita Jean-Baptiste, [address] [place] [address]  Ph: [phone]  Fax: [phone]    This note was scribed in the presence of Dr Renita Jean-Baptiste MD by Kang Arroyo RN. Physician Attestation: The scribes documentation has been prepared under my direction and personally reviewed by me in its entirety.  I confirm that the note above accurately reflects all work, treatment, procedures, and medical decision making performed by me. All portions of the note including but not limited to the chief complaint, history of present illness, physical exam, assessment and plan/medical decision making were personally reviewed, edited, and updated on the day of the visit.

## 2021-11-09 ENCOUNTER — OFFICE VISIT (OUTPATIENT)
Dept: CARDIOLOGY CLINIC | Age: 46
End: 2021-11-09
Payer: COMMERCIAL

## 2021-11-09 VITALS
HEART RATE: 71 BPM | HEIGHT: 61 IN | WEIGHT: 193 LBS | BODY MASS INDEX: 36.44 KG/M2 | DIASTOLIC BLOOD PRESSURE: 84 MMHG | OXYGEN SATURATION: 98 % | SYSTOLIC BLOOD PRESSURE: 130 MMHG

## 2021-11-09 DIAGNOSIS — I42.1 HOCM (HYPERTROPHIC OBSTRUCTIVE CARDIOMYOPATHY) (HCC): Primary | ICD-10-CM

## 2021-11-09 DIAGNOSIS — G47.33 OSA (OBSTRUCTIVE SLEEP APNEA): ICD-10-CM

## 2021-11-09 DIAGNOSIS — I51.7 LVH (LEFT VENTRICULAR HYPERTROPHY): ICD-10-CM

## 2021-11-09 DIAGNOSIS — I10 ESSENTIAL HYPERTENSION: ICD-10-CM

## 2021-11-09 DIAGNOSIS — I34.0 NONRHEUMATIC MITRAL VALVE REGURGITATION: ICD-10-CM

## 2021-11-09 PROCEDURE — 1036F TOBACCO NON-USER: CPT | Performed by: INTERNAL MEDICINE

## 2021-11-09 PROCEDURE — G8417 CALC BMI ABV UP PARAM F/U: HCPCS | Performed by: INTERNAL MEDICINE

## 2021-11-09 PROCEDURE — 99214 OFFICE O/P EST MOD 30 MIN: CPT | Performed by: INTERNAL MEDICINE

## 2021-11-09 PROCEDURE — G8484 FLU IMMUNIZE NO ADMIN: HCPCS | Performed by: INTERNAL MEDICINE

## 2021-11-09 PROCEDURE — G8427 DOCREV CUR MEDS BY ELIG CLIN: HCPCS | Performed by: INTERNAL MEDICINE

## 2021-11-09 RX ORDER — METOPROLOL SUCCINATE 50 MG/1
50 TABLET, EXTENDED RELEASE ORAL DAILY
Qty: 90 TABLET | Refills: 3 | Status: SHIPPED | OUTPATIENT
Start: 2021-11-09 | End: 2022-02-11

## 2021-11-09 RX ORDER — LISINOPRIL 20 MG/1
20 TABLET ORAL DAILY
Qty: 90 TABLET | Refills: 3 | Status: SHIPPED | OUTPATIENT
Start: 2021-11-09 | End: 2022-06-23 | Stop reason: SDUPTHER

## 2022-02-11 RX ORDER — METOPROLOL SUCCINATE 50 MG/1
50 TABLET, EXTENDED RELEASE ORAL DAILY
Qty: 90 TABLET | Refills: 1 | Status: SHIPPED | OUTPATIENT
Start: 2022-02-11 | End: 2022-06-23 | Stop reason: SDUPTHER

## 2022-06-22 ENCOUNTER — PATIENT MESSAGE (OUTPATIENT)
Dept: CARDIOLOGY CLINIC | Age: 47
End: 2022-06-22

## 2022-06-22 NOTE — TELEPHONE ENCOUNTER
From: Eliezer Thomas  To: Dr. Knutson Duel: 2022 4:00 PM EDT  Subject: Are my refills ?      I need a refill soon

## 2022-06-23 RX ORDER — LISINOPRIL 20 MG/1
20 TABLET ORAL DAILY
Qty: 90 TABLET | Refills: 3 | Status: SHIPPED | OUTPATIENT
Start: 2022-06-23

## 2022-06-23 RX ORDER — METOPROLOL SUCCINATE 50 MG/1
50 TABLET, EXTENDED RELEASE ORAL DAILY
Qty: 90 TABLET | Refills: 3 | Status: SHIPPED | OUTPATIENT
Start: 2022-06-23

## 2022-11-08 NOTE — PROGRESS NOTES
Patient not seen today as he is at surgery. Will start B-blocker tomorrow. He could be discharged from cardiology perspective. Will sign off. Call with questions. Outpatient follow-up scheduled. Will likely arrange for outpatient cardiac MRI as LVH seems out of proportion to degree of HTN (BP controlled on only lisinopril 10mg ). I attest my time as attending is greater than 50% of the total combined time spent on qualifying patient care activities by the PA/NP and attending.

## 2023-01-31 RX ORDER — LISINOPRIL 20 MG/1
20 TABLET ORAL DAILY
Qty: 30 TABLET | Refills: 0 | Status: SHIPPED | OUTPATIENT
Start: 2023-01-31

## 2023-01-31 RX ORDER — METOPROLOL SUCCINATE 50 MG/1
50 TABLET, EXTENDED RELEASE ORAL DAILY
Qty: 30 TABLET | Refills: 0 | Status: SHIPPED | OUTPATIENT
Start: 2023-01-31

## 2023-01-31 NOTE — TELEPHONE ENCOUNTER
Requested Prescriptions     Pending Prescriptions Disp Refills    metoprolol succinate (TOPROL XL) 50 MG extended release tablet 30 tablet 0     Sig: Take 1 tablet by mouth daily    lisinopril (PRINIVIL;ZESTRIL) 20 MG tablet 30 tablet 0     Sig: Take 1 tablet by mouth daily            Last Office Visit: 11/9/21    Next Office Visit: 2/20/2023     Last Refill: 6/23/22    Last Labs: 5/18/21    30 day supply until next OV

## 2023-09-12 RX ORDER — LISINOPRIL 20 MG/1
20 TABLET ORAL DAILY
Qty: 90 TABLET | Refills: 0 | Status: SHIPPED | OUTPATIENT
Start: 2023-09-12

## 2023-09-12 NOTE — TELEPHONE ENCOUNTER
Requested Prescriptions     Pending Prescriptions Disp Refills    lisinopril (PRINIVIL;ZESTRIL) 20 MG tablet [Pharmacy Med Name: LISINOPRIL 20MG TABLETS] 90 tablet      Sig: TAKE 1 TABLET BY MOUTH DAILY          Number: 90    Refills: 0    Last Office Visit: Visit date not found     Next Office Visit: Visit date not found     Last Refill: 01/31/23    Last Labs:  05/18/2021

## (undated) DEVICE — BITE BLK 60FR GRN ENDOSCP AD W STRP SLD DISPOSABLE

## (undated) DEVICE — TROCARS: Brand: KII® BALLOON BLUNT TIP SYSTEM

## (undated) DEVICE — RETRIEVAL BALLOON CATHETER: Brand: EXTRACTOR™ PRO RX

## (undated) DEVICE — TROCAR ENDOSCP L100MM DIA5MM BLDELSS STBL SL OBT RADLUC

## (undated) DEVICE — ORGANIZER MED DEV W76XL86CM PCH W25XL28CM FOR ENDOSCP TBL

## (undated) DEVICE — SPHINCTEROTOME: Brand: JAGTOME RX 39

## (undated) DEVICE — 3M™ STERI-STRIP™ COMPOUND BENZOIN TINCTURE 40 BAGS/CARTON 4 CARTONS/CASE C1544: Brand: 3M™ STERI-STRIP™

## (undated) DEVICE — Device

## (undated) DEVICE — ENDOSCOPY KIT: Brand: MEDLINE INDUSTRIES, INC.

## (undated) DEVICE — LAPAROSCOPY PK

## (undated) DEVICE — SNARE ENDOSCP 11 MM BRAIDED WIRE CAPTFLX DISP

## (undated) DEVICE — DRAPE,LAP,CHOLE,W/TROUGHS,STERILE: Brand: MEDLINE

## (undated) DEVICE — CHLORAPREP 26ML ORANGE

## (undated) DEVICE — STRIP,CLOSURE,WOUND,MEDI-STRIP,1/2X4: Brand: MEDLINE

## (undated) DEVICE — CANISTER, RIGID, 1200CC: Brand: MEDLINE INDUSTRIES, INC.

## (undated) DEVICE — NEEDLE HYPO 25GA L1.5IN BVL ORIENTED ECLIPSE

## (undated) DEVICE — ELECTRODE PT RET AD L9FT HI MOIST COND ADH HYDRGEL CORDED

## (undated) DEVICE — GUIDEWIRE ENDOSCP L260CM DIA0.025IN STD BILI HYDRPHLC RND

## (undated) DEVICE — SOLUTION IV IRRIG POUR BRL 0.9% SODIUM CHL 2F7124

## (undated) DEVICE — GAUZE,SPONGE,2"X2",8PLY,STERILE,LF,2'S: Brand: MEDLINE

## (undated) DEVICE — BAG SPEC REM 224ML W4XL6IN DIA10MM 1 HND GYN DISP ENDOPCH

## (undated) DEVICE — SINGLE USE 3-LUMEN NEEDLE KNIFE V: Brand: SINGLE USE 3-LUMEN NEEDLE KNIFE V

## (undated) DEVICE — GLOVE ORANGE PI 7   MSG9070

## (undated) DEVICE — DEVICE LCK HLDR WIRE MICROVASIVE RAP EXCHG OLY FUJINON RX

## (undated) DEVICE — KIT OR ROOM TURNOVER W/STRAP

## (undated) DEVICE — SYRINGE 20ML LL S/C 50

## (undated) DEVICE — SUTURE VCRL SZ 4-0 L18IN ABSRB UD L19MM PS-2 3/8 CIR PRIM J496H

## (undated) DEVICE — COVER LT HNDL BLU PLAS

## (undated) DEVICE — [HIGH FLOW INSUFFLATOR,  DO NOT USE IF PACKAGE IS DAMAGED,  KEEP DRY,  KEEP AWAY FROM SUNLIGHT,  PROTECT FROM HEAT AND RADIOACTIVE SOURCES.]: Brand: PNEUMOSURE

## (undated) DEVICE — APPLIER CLP M/L SHFT DIA5MM 15 LIG LIGAMAX 5

## (undated) DEVICE — ADTEC SINGLE USE HOOK SCISSORS, SHAFT ONLY, MONOPOLAR, STRAIGHT, WORKING LENGTH: 12 1/4", (310 MM), DIAM. 5 MM, BLUNT/BLUNT, INSULATED, SINGLE ACTION, STERILE, DISPOSABLE, PACKAGE OF 10 PIECES: Brand: AESCULAP

## (undated) DEVICE — WIREGUIDED CYTOLOGY BRUSH: Brand: RX CYTOLOGY BRUSH

## (undated) DEVICE — SUTURE SZ 0 27IN 5/8 CIR UR-6  TAPER PT VIOLET ABSRB VICRYL J603H

## (undated) DEVICE — SOLUTION IV 1000ML 0.9% SOD CHL FOR IRRIG PLAS CONT

## (undated) DEVICE — GARMENT COMPR STD FOR 17IN CALF UNIF THER FLOTRN

## (undated) DEVICE — 3M™ TEGADERM™ TRANSPARENT FILM DRESSING FRAME STYLE, 1624W, 2-3/8 IN X 2-3/4 IN (6 CM X 7 CM), 100/CT 4CT/CASE: Brand: 3M™ TEGADERM™

## (undated) DEVICE — SYRINGE MED 30ML STD CLR PLAS LUERLOCK TIP N CTRL DISP

## (undated) DEVICE — GOWN SIRUS NONREIN LG W/TWL: Brand: MEDLINE INDUSTRIES, INC.